# Patient Record
Sex: MALE | Race: WHITE | Employment: OTHER | URBAN - METROPOLITAN AREA
[De-identification: names, ages, dates, MRNs, and addresses within clinical notes are randomized per-mention and may not be internally consistent; named-entity substitution may affect disease eponyms.]

---

## 2018-03-09 PROBLEM — I10 HTN (HYPERTENSION): Status: ACTIVE | Noted: 2018-03-09

## 2018-03-09 PROBLEM — E87.1 HYPONATREMIA: Status: ACTIVE | Noted: 2018-03-09

## 2018-03-09 PROBLEM — N40.0 BPH (BENIGN PROSTATIC HYPERPLASIA): Status: ACTIVE | Noted: 2018-03-09

## 2018-03-09 PROBLEM — F17.200 SMOKER: Status: ACTIVE | Noted: 2018-03-09

## 2018-03-09 PROBLEM — E78.5 HLD (HYPERLIPIDEMIA): Status: ACTIVE | Noted: 2018-03-09

## 2018-03-09 PROBLEM — E11.9 DIABETES (HCC): Status: ACTIVE | Noted: 2018-03-09

## 2018-03-09 PROBLEM — L03.90 CELLULITIS: Status: ACTIVE | Noted: 2018-03-09

## 2018-03-14 ENCOUNTER — TELEPHONE (OUTPATIENT)
Dept: CARDIOTHORACIC SURGERY | Age: 73
End: 2018-03-14

## 2018-03-23 ENCOUNTER — OFFICE VISIT (OUTPATIENT)
Dept: VASCULAR SURGERY | Age: 73
End: 2018-03-23

## 2018-03-23 VITALS
BODY MASS INDEX: 27.71 KG/M2 | OXYGEN SATURATION: 97 % | DIASTOLIC BLOOD PRESSURE: 70 MMHG | WEIGHT: 182.8 LBS | SYSTOLIC BLOOD PRESSURE: 138 MMHG | HEART RATE: 59 BPM | HEIGHT: 68 IN

## 2018-03-23 DIAGNOSIS — I89.0 LYMPHEDEMA: ICD-10-CM

## 2018-03-23 PROCEDURE — 99214 OFFICE O/P EST MOD 30 MIN: CPT | Performed by: SURGERY

## 2018-03-23 PROCEDURE — 3017F COLORECTAL CA SCREEN DOC REV: CPT | Performed by: SURGERY

## 2018-03-23 PROCEDURE — G8427 DOCREV CUR MEDS BY ELIG CLIN: HCPCS | Performed by: SURGERY

## 2018-03-23 PROCEDURE — G8484 FLU IMMUNIZE NO ADMIN: HCPCS | Performed by: SURGERY

## 2018-03-23 PROCEDURE — G8419 CALC BMI OUT NRM PARAM NOF/U: HCPCS | Performed by: SURGERY

## 2018-03-23 PROCEDURE — 4040F PNEUMOC VAC/ADMIN/RCVD: CPT | Performed by: SURGERY

## 2018-03-28 ENCOUNTER — HOSPITAL ENCOUNTER (OUTPATIENT)
Dept: OTHER | Age: 73
Discharge: HOME | End: 2018-03-28
Attending: OPHTHALMOLOGY | Admitting: OPHTHALMOLOGY

## 2018-03-28 VITALS
WEIGHT: 182 LBS | HEART RATE: 59 BPM | HEIGHT: 68 IN | BODY MASS INDEX: 27.58 KG/M2 | SYSTOLIC BLOOD PRESSURE: 140 MMHG | DIASTOLIC BLOOD PRESSURE: 78 MMHG | RESPIRATION RATE: 16 BRPM

## 2018-03-28 RX ORDER — APRACLONIDINE HYDROCHLORIDE 5 MG/ML
1 SOLUTION/ DROPS OPHTHALMIC 2 TIMES DAILY PRN
Status: DISCONTINUED | OUTPATIENT
Start: 2018-03-28 | End: 2018-03-29 | Stop reason: HOSPADM

## 2018-03-28 RX ORDER — PROPARACAINE HYDROCHLORIDE 5 MG/ML
1 SOLUTION/ DROPS OPHTHALMIC ONCE
Status: COMPLETED | OUTPATIENT
Start: 2018-03-28 | End: 2018-03-28

## 2018-03-28 RX ORDER — SOFT LENS RINSE,STORE SOLUTION
SOLUTION, NON-ORAL MISCELLANEOUS ONCE
Status: COMPLETED | OUTPATIENT
Start: 2018-03-28 | End: 2018-03-28

## 2018-03-28 RX ORDER — TROPICAMIDE 10 MG/ML
1 SOLUTION/ DROPS OPHTHALMIC ONCE
Status: COMPLETED | OUTPATIENT
Start: 2018-03-28 | End: 2018-03-28

## 2018-03-28 RX ORDER — PHENYLEPHRINE HCL 2.5 %
1 DROPS OPHTHALMIC (EYE) ONCE
Status: COMPLETED | OUTPATIENT
Start: 2018-03-28 | End: 2018-03-28

## 2018-03-28 RX ADMIN — PROPARACAINE HYDROCHLORIDE 1 DROP: 5 SOLUTION/ DROPS OPHTHALMIC at 12:30

## 2018-03-28 RX ADMIN — Medication 1 DROP: at 11:36

## 2018-03-28 RX ADMIN — APRACLONIDINE HYDROCHLORIDE 1 DROP: 5 SOLUTION/ DROPS OPHTHALMIC at 11:43

## 2018-03-28 RX ADMIN — APRACLONIDINE HYDROCHLORIDE 1 DROP: 5 SOLUTION/ DROPS OPHTHALMIC at 12:54

## 2018-03-28 RX ADMIN — Medication: at 12:52

## 2018-03-28 RX ADMIN — TROPICAMIDE 1 DROP: 10 SOLUTION/ DROPS OPHTHALMIC at 11:30

## 2018-03-28 ASSESSMENT — PAIN - FUNCTIONAL ASSESSMENT
PAIN_FUNCTIONAL_ASSESSMENT: 0-10
PAIN_FUNCTIONAL_ASSESSMENT: 0-10

## 2018-04-12 ENCOUNTER — HOSPITAL ENCOUNTER (OUTPATIENT)
Dept: OTHER | Age: 73
Discharge: HOME | End: 2018-04-12
Attending: OPHTHALMOLOGY | Admitting: OPHTHALMOLOGY

## 2018-04-12 VITALS
WEIGHT: 182 LBS | HEART RATE: 59 BPM | SYSTOLIC BLOOD PRESSURE: 151 MMHG | DIASTOLIC BLOOD PRESSURE: 78 MMHG | RESPIRATION RATE: 16 BRPM | BODY MASS INDEX: 27.58 KG/M2 | HEIGHT: 68 IN

## 2018-04-12 RX ORDER — TROPICAMIDE 10 MG/ML
1 SOLUTION/ DROPS OPHTHALMIC ONCE
Status: COMPLETED | OUTPATIENT
Start: 2018-04-12 | End: 2018-04-12

## 2018-04-12 RX ORDER — PROPARACAINE HYDROCHLORIDE 5 MG/ML
1 SOLUTION/ DROPS OPHTHALMIC ONCE
Status: COMPLETED | OUTPATIENT
Start: 2018-04-12 | End: 2018-04-12

## 2018-04-12 RX ORDER — APRACLONIDINE HYDROCHLORIDE 5 MG/ML
1 SOLUTION/ DROPS OPHTHALMIC 2 TIMES DAILY PRN
Status: COMPLETED | OUTPATIENT
Start: 2018-04-12 | End: 2018-04-12

## 2018-04-12 RX ORDER — PHENYLEPHRINE HCL 2.5 %
1 DROPS OPHTHALMIC (EYE) ONCE
Status: COMPLETED | OUTPATIENT
Start: 2018-04-12 | End: 2018-04-12

## 2018-04-12 RX ORDER — SOFT LENS RINSE,STORE SOLUTION
SOLUTION, NON-ORAL MISCELLANEOUS ONCE
Status: COMPLETED | OUTPATIENT
Start: 2018-04-12 | End: 2018-04-12

## 2018-04-12 RX ADMIN — APRACLONIDINE HYDROCHLORIDE 1 DROP: 5 SOLUTION/ DROPS OPHTHALMIC at 11:27

## 2018-04-12 RX ADMIN — Medication: at 11:25

## 2018-04-12 RX ADMIN — TROPICAMIDE 1 DROP: 10 SOLUTION/ DROPS OPHTHALMIC at 11:23

## 2018-04-12 RX ADMIN — APRACLONIDINE HYDROCHLORIDE 1 DROP: 5 SOLUTION/ DROPS OPHTHALMIC at 12:27

## 2018-04-12 RX ADMIN — Medication 1 DROP: at 11:25

## 2018-04-12 RX ADMIN — PROPARACAINE HYDROCHLORIDE 1 DROP: 5 SOLUTION/ DROPS OPHTHALMIC at 12:21

## 2018-04-12 ASSESSMENT — PAIN - FUNCTIONAL ASSESSMENT: PAIN_FUNCTIONAL_ASSESSMENT: 0-10

## 2018-10-26 ENCOUNTER — HOSPITAL ENCOUNTER (EMERGENCY)
Age: 73
Discharge: HOME | End: 2018-10-26
Attending: EMERGENCY MEDICINE
Payer: MEDICARE

## 2018-10-26 ENCOUNTER — APPOINTMENT (OUTPATIENT)
Dept: CT IMAGING | Age: 73
End: 2018-10-26
Payer: MEDICARE

## 2018-10-26 ENCOUNTER — APPOINTMENT (OUTPATIENT)
Dept: GENERAL RADIOLOGY | Age: 73
End: 2018-10-26
Payer: MEDICARE

## 2018-10-26 VITALS
RESPIRATION RATE: 15 BRPM | OXYGEN SATURATION: 98 % | DIASTOLIC BLOOD PRESSURE: 74 MMHG | TEMPERATURE: 99.8 F | SYSTOLIC BLOOD PRESSURE: 129 MMHG | HEART RATE: 89 BPM

## 2018-10-26 DIAGNOSIS — I95.1 ORTHOSTATIC HYPOTENSION: ICD-10-CM

## 2018-10-26 DIAGNOSIS — R42 DIZZINESS: Primary | ICD-10-CM

## 2018-10-26 DIAGNOSIS — L03.116 LEFT LEG CELLULITIS: ICD-10-CM

## 2018-10-26 LAB
A/G RATIO: 0.9 (ref 1.1–2.2)
ALBUMIN SERPL-MCNC: 3.2 G/DL (ref 3.4–5)
ALP BLD-CCNC: 67 U/L (ref 40–129)
ALT SERPL-CCNC: 8 U/L (ref 10–40)
ANION GAP SERPL CALCULATED.3IONS-SCNC: 12 MMOL/L (ref 3–16)
AST SERPL-CCNC: 14 U/L (ref 15–37)
BASOPHILS ABSOLUTE: 0 K/UL (ref 0–0.2)
BASOPHILS RELATIVE PERCENT: 0.3 %
BILIRUB SERPL-MCNC: 1.1 MG/DL (ref 0–1)
BILIRUBIN URINE: NEGATIVE
BLOOD, URINE: ABNORMAL
BUN BLDV-MCNC: 9 MG/DL (ref 7–20)
CALCIUM SERPL-MCNC: 8.8 MG/DL (ref 8.3–10.6)
CHLORIDE BLD-SCNC: 96 MMOL/L (ref 99–110)
CLARITY: CLEAR
CO2: 24 MMOL/L (ref 21–32)
COLOR: YELLOW
CREAT SERPL-MCNC: 0.8 MG/DL (ref 0.8–1.3)
EKG ATRIAL RATE: 97 BPM
EKG DIAGNOSIS: NORMAL
EKG P AXIS: 24 DEGREES
EKG P-R INTERVAL: 152 MS
EKG Q-T INTERVAL: 328 MS
EKG QRS DURATION: 70 MS
EKG QTC CALCULATION (BAZETT): 416 MS
EKG R AXIS: 41 DEGREES
EKG T AXIS: 44 DEGREES
EKG VENTRICULAR RATE: 97 BPM
EOSINOPHILS ABSOLUTE: 0 K/UL (ref 0–0.6)
EOSINOPHILS RELATIVE PERCENT: 0 %
GFR AFRICAN AMERICAN: >60
GFR NON-AFRICAN AMERICAN: >60
GLOBULIN: 3.4 G/DL
GLUCOSE BLD-MCNC: 108 MG/DL (ref 70–99)
GLUCOSE URINE: NEGATIVE MG/DL
HCT VFR BLD CALC: 45.1 % (ref 40.5–52.5)
HEMOGLOBIN: 14.9 G/DL (ref 13.5–17.5)
KETONES, URINE: NEGATIVE MG/DL
LACTIC ACID: 1.4 MMOL/L (ref 0.4–2)
LEUKOCYTE ESTERASE, URINE: NEGATIVE
LYMPHOCYTES ABSOLUTE: 0.4 K/UL (ref 1–5.1)
LYMPHOCYTES RELATIVE PERCENT: 2.9 %
MCH RBC QN AUTO: 30.6 PG (ref 26–34)
MCHC RBC AUTO-ENTMCNC: 33 G/DL (ref 31–36)
MCV RBC AUTO: 92.8 FL (ref 80–100)
MICROSCOPIC EXAMINATION: YES
MONOCYTES ABSOLUTE: 0.6 K/UL (ref 0–1.3)
MONOCYTES RELATIVE PERCENT: 4.4 %
NEUTROPHILS ABSOLUTE: 13.4 K/UL (ref 1.7–7.7)
NEUTROPHILS RELATIVE PERCENT: 92.4 %
NITRITE, URINE: NEGATIVE
PDW BLD-RTO: 13.5 % (ref 12.4–15.4)
PH UA: 7.5
PLATELET # BLD: 161 K/UL (ref 135–450)
PMV BLD AUTO: 7.9 FL (ref 5–10.5)
POTASSIUM SERPL-SCNC: 3.8 MMOL/L (ref 3.5–5.1)
PROTEIN UA: NEGATIVE MG/DL
RBC # BLD: 4.86 M/UL (ref 4.2–5.9)
RBC UA: ABNORMAL /HPF (ref 0–2)
SODIUM BLD-SCNC: 132 MMOL/L (ref 136–145)
SPECIFIC GRAVITY UA: 1.01
TOTAL PROTEIN: 6.6 G/DL (ref 6.4–8.2)
TROPONIN: <0.01 NG/ML
URINE TYPE: ABNORMAL
UROBILINOGEN, URINE: 2 E.U./DL
WBC # BLD: 14.5 K/UL (ref 4–11)
WBC UA: ABNORMAL /HPF (ref 0–5)

## 2018-10-26 PROCEDURE — 6360000004 HC RX CONTRAST MEDICATION: Performed by: EMERGENCY MEDICINE

## 2018-10-26 PROCEDURE — 96360 HYDRATION IV INFUSION INIT: CPT

## 2018-10-26 PROCEDURE — 71046 X-RAY EXAM CHEST 2 VIEWS: CPT

## 2018-10-26 PROCEDURE — 2580000003 HC RX 258: Performed by: EMERGENCY MEDICINE

## 2018-10-26 PROCEDURE — 81001 URINALYSIS AUTO W/SCOPE: CPT

## 2018-10-26 PROCEDURE — 84484 ASSAY OF TROPONIN QUANT: CPT

## 2018-10-26 PROCEDURE — 96361 HYDRATE IV INFUSION ADD-ON: CPT

## 2018-10-26 PROCEDURE — 85025 COMPLETE CBC W/AUTO DIFF WBC: CPT

## 2018-10-26 PROCEDURE — 93010 ELECTROCARDIOGRAM REPORT: CPT | Performed by: INTERNAL MEDICINE

## 2018-10-26 PROCEDURE — 93005 ELECTROCARDIOGRAM TRACING: CPT | Performed by: PHYSICIAN ASSISTANT

## 2018-10-26 PROCEDURE — 80053 COMPREHEN METABOLIC PANEL: CPT

## 2018-10-26 PROCEDURE — 83605 ASSAY OF LACTIC ACID: CPT

## 2018-10-26 PROCEDURE — 99285 EMERGENCY DEPT VISIT HI MDM: CPT

## 2018-10-26 PROCEDURE — 71260 CT THORAX DX C+: CPT

## 2018-10-26 RX ORDER — CLINDAMYCIN HYDROCHLORIDE 150 MG/1
450 CAPSULE ORAL 3 TIMES DAILY
Qty: 90 CAPSULE | Refills: 0 | Status: SHIPPED | OUTPATIENT
Start: 2018-10-26 | End: 2018-11-05

## 2018-10-26 RX ORDER — SODIUM CHLORIDE 9 MG/ML
1000 INJECTION, SOLUTION INTRAVENOUS ONCE
Status: COMPLETED | OUTPATIENT
Start: 2018-10-26 | End: 2018-10-26

## 2018-10-26 RX ADMIN — IOPAMIDOL 75 ML: 755 INJECTION, SOLUTION INTRAVENOUS at 13:36

## 2018-10-26 RX ADMIN — SODIUM CHLORIDE 1000 ML: 9 INJECTION, SOLUTION INTRAVENOUS at 15:05

## 2018-10-26 ASSESSMENT — ENCOUNTER SYMPTOMS
ABDOMINAL PAIN: 0
NAUSEA: 0
RHINORRHEA: 0
SORE THROAT: 0
CONSTIPATION: 0
TROUBLE SWALLOWING: 0
SHORTNESS OF BREATH: 0
DIARRHEA: 0
VOMITING: 0
COUGH: 0
BACK PAIN: 0
CHEST TIGHTNESS: 0

## 2019-10-09 ENCOUNTER — ANESTHESIA (OUTPATIENT)
Dept: ENDOSCOPY | Age: 74
End: 2019-10-09
Payer: MEDICARE

## 2019-10-09 ENCOUNTER — HOSPITAL ENCOUNTER (OUTPATIENT)
Age: 74
Setting detail: OUTPATIENT SURGERY
Discharge: HOME | End: 2019-10-09
Attending: INTERNAL MEDICINE | Admitting: INTERNAL MEDICINE
Payer: MEDICARE

## 2019-10-09 ENCOUNTER — ANESTHESIA EVENT (OUTPATIENT)
Dept: ENDOSCOPY | Age: 74
End: 2019-10-09
Payer: MEDICARE

## 2019-10-09 ENCOUNTER — APPOINTMENT (OUTPATIENT)
Dept: GENERAL RADIOLOGY | Age: 74
End: 2019-10-09
Attending: INTERNAL MEDICINE
Payer: MEDICARE

## 2019-10-09 VITALS
OXYGEN SATURATION: 99 % | HEIGHT: 68 IN | SYSTOLIC BLOOD PRESSURE: 138 MMHG | TEMPERATURE: 96.8 F | RESPIRATION RATE: 16 BRPM | BODY MASS INDEX: 25.31 KG/M2 | WEIGHT: 167 LBS | HEART RATE: 52 BPM | DIASTOLIC BLOOD PRESSURE: 72 MMHG

## 2019-10-09 VITALS — OXYGEN SATURATION: 98 % | SYSTOLIC BLOOD PRESSURE: 87 MMHG | DIASTOLIC BLOOD PRESSURE: 62 MMHG

## 2019-10-09 LAB
GLUCOSE BLD-MCNC: 95 MG/DL (ref 70–99)
PERFORMED ON: NORMAL

## 2019-10-09 PROCEDURE — 3700000000 HC ANESTHESIA ATTENDED CARE: Performed by: INTERNAL MEDICINE

## 2019-10-09 PROCEDURE — 3609017700 HC EGD DILATION GASTRIC/DUODENAL STRICTURE: Performed by: INTERNAL MEDICINE

## 2019-10-09 PROCEDURE — 7100000010 HC PHASE II RECOVERY - FIRST 15 MIN: Performed by: INTERNAL MEDICINE

## 2019-10-09 PROCEDURE — 3700000001 HC ADD 15 MINUTES (ANESTHESIA): Performed by: INTERNAL MEDICINE

## 2019-10-09 PROCEDURE — 7100000011 HC PHASE II RECOVERY - ADDTL 15 MIN: Performed by: INTERNAL MEDICINE

## 2019-10-09 PROCEDURE — 71045 X-RAY EXAM CHEST 1 VIEW: CPT

## 2019-10-09 PROCEDURE — 2580000003 HC RX 258: Performed by: INTERNAL MEDICINE

## 2019-10-09 PROCEDURE — C1726 CATH, BAL DIL, NON-VASCULAR: HCPCS | Performed by: INTERNAL MEDICINE

## 2019-10-09 PROCEDURE — 6360000002 HC RX W HCPCS: Performed by: NURSE ANESTHETIST, CERTIFIED REGISTERED

## 2019-10-09 PROCEDURE — 3609018500 HC EGD US SCOPE W/ADJACENT STRUCTURES: Performed by: INTERNAL MEDICINE

## 2019-10-09 PROCEDURE — 2709999900 HC NON-CHARGEABLE SUPPLY: Performed by: INTERNAL MEDICINE

## 2019-10-09 RX ORDER — PROPOFOL 10 MG/ML
INJECTION, EMULSION INTRAVENOUS PRN
Status: DISCONTINUED | OUTPATIENT
Start: 2019-10-09 | End: 2019-10-09 | Stop reason: SDUPTHER

## 2019-10-09 RX ORDER — FENTANYL CITRATE 50 UG/ML
INJECTION, SOLUTION INTRAMUSCULAR; INTRAVENOUS PRN
Status: DISCONTINUED | OUTPATIENT
Start: 2019-10-09 | End: 2019-10-09 | Stop reason: SDUPTHER

## 2019-10-09 RX ORDER — SODIUM CHLORIDE, SODIUM LACTATE, POTASSIUM CHLORIDE, CALCIUM CHLORIDE 600; 310; 30; 20 MG/100ML; MG/100ML; MG/100ML; MG/100ML
INJECTION, SOLUTION INTRAVENOUS CONTINUOUS
Status: DISCONTINUED | OUTPATIENT
Start: 2019-10-09 | End: 2019-10-09 | Stop reason: HOSPADM

## 2019-10-09 RX ADMIN — PROPOFOL 500 MG: 10 INJECTION, EMULSION INTRAVENOUS at 11:55

## 2019-10-09 RX ADMIN — FENTANYL CITRATE 50 MCG: 50 INJECTION INTRAMUSCULAR; INTRAVENOUS at 11:55

## 2019-10-09 RX ADMIN — SODIUM CHLORIDE, POTASSIUM CHLORIDE, SODIUM LACTATE AND CALCIUM CHLORIDE: 600; 310; 30; 20 INJECTION, SOLUTION INTRAVENOUS at 11:55

## 2019-10-09 ASSESSMENT — PULMONARY FUNCTION TESTS
PIF_VALUE: 0
PIF_VALUE: 1
PIF_VALUE: 0
PIF_VALUE: 1
PIF_VALUE: 0

## 2019-10-09 ASSESSMENT — PAIN - FUNCTIONAL ASSESSMENT: PAIN_FUNCTIONAL_ASSESSMENT: 0-10

## 2020-11-22 ENCOUNTER — APPOINTMENT (OUTPATIENT)
Dept: GENERAL RADIOLOGY | Age: 75
End: 2020-11-22
Payer: MEDICARE

## 2020-11-22 ENCOUNTER — HOSPITAL ENCOUNTER (EMERGENCY)
Age: 75
Discharge: HOME | End: 2020-11-22
Attending: EMERGENCY MEDICINE
Payer: MEDICARE

## 2020-11-22 VITALS
TEMPERATURE: 97.9 F | OXYGEN SATURATION: 97 % | HEART RATE: 74 BPM | DIASTOLIC BLOOD PRESSURE: 79 MMHG | RESPIRATION RATE: 16 BRPM | SYSTOLIC BLOOD PRESSURE: 157 MMHG

## 2020-11-22 LAB
A/G RATIO: 1.1 (ref 1.1–2.2)
ALBUMIN SERPL-MCNC: 3.5 G/DL (ref 3.4–5)
ALP BLD-CCNC: 88 U/L (ref 40–129)
ALT SERPL-CCNC: 12 U/L (ref 10–40)
ANION GAP SERPL CALCULATED.3IONS-SCNC: 6 MMOL/L (ref 3–16)
AST SERPL-CCNC: 18 U/L (ref 15–37)
BASOPHILS ABSOLUTE: 0.1 K/UL (ref 0–0.2)
BASOPHILS RELATIVE PERCENT: 0.9 %
BILIRUB SERPL-MCNC: 0.6 MG/DL (ref 0–1)
BUN BLDV-MCNC: 9 MG/DL (ref 7–20)
CALCIUM SERPL-MCNC: 9.3 MG/DL (ref 8.3–10.6)
CHLORIDE BLD-SCNC: 99 MMOL/L (ref 99–110)
CO2: 29 MMOL/L (ref 21–32)
CREAT SERPL-MCNC: 0.9 MG/DL (ref 0.8–1.3)
D DIMER: 4333 NG/ML DDU (ref 0–229)
EOSINOPHILS ABSOLUTE: 0.1 K/UL (ref 0–0.6)
EOSINOPHILS RELATIVE PERCENT: 1.6 %
GFR AFRICAN AMERICAN: >60
GFR NON-AFRICAN AMERICAN: >60
GLOBULIN: 3.1 G/DL
GLUCOSE BLD-MCNC: 121 MG/DL (ref 70–99)
HCT VFR BLD CALC: 35.9 % (ref 40.5–52.5)
HEMOGLOBIN: 11.6 G/DL (ref 13.5–17.5)
LYMPHOCYTES ABSOLUTE: 0.5 K/UL (ref 1–5.1)
LYMPHOCYTES RELATIVE PERCENT: 6.1 %
MCH RBC QN AUTO: 25.8 PG (ref 26–34)
MCHC RBC AUTO-ENTMCNC: 32.3 G/DL (ref 31–36)
MCV RBC AUTO: 79.8 FL (ref 80–100)
MONOCYTES ABSOLUTE: 1 K/UL (ref 0–1.3)
MONOCYTES RELATIVE PERCENT: 11.8 %
NEUTROPHILS ABSOLUTE: 6.7 K/UL (ref 1.7–7.7)
NEUTROPHILS RELATIVE PERCENT: 79.6 %
PDW BLD-RTO: 17.6 % (ref 12.4–15.4)
PLATELET # BLD: 194 K/UL (ref 135–450)
PMV BLD AUTO: 7.7 FL (ref 5–10.5)
POTASSIUM REFLEX MAGNESIUM: 4.1 MMOL/L (ref 3.5–5.1)
RBC # BLD: 4.5 M/UL (ref 4.2–5.9)
SEDIMENTATION RATE, ERYTHROCYTE: 25 MM/HR (ref 0–20)
SODIUM BLD-SCNC: 134 MMOL/L (ref 136–145)
TOTAL PROTEIN: 6.6 G/DL (ref 6.4–8.2)
URIC ACID, SERUM: 5.8 MG/DL (ref 3.5–7.2)
WBC # BLD: 8.4 K/UL (ref 4–11)

## 2020-11-22 PROCEDURE — 6370000000 HC RX 637 (ALT 250 FOR IP): Performed by: FAMILY MEDICINE

## 2020-11-22 PROCEDURE — 99284 EMERGENCY DEPT VISIT MOD MDM: CPT

## 2020-11-22 PROCEDURE — 73110 X-RAY EXAM OF WRIST: CPT

## 2020-11-22 PROCEDURE — 85652 RBC SED RATE AUTOMATED: CPT

## 2020-11-22 PROCEDURE — 85379 FIBRIN DEGRADATION QUANT: CPT

## 2020-11-22 PROCEDURE — 85025 COMPLETE CBC W/AUTO DIFF WBC: CPT

## 2020-11-22 PROCEDURE — 84550 ASSAY OF BLOOD/URIC ACID: CPT

## 2020-11-22 PROCEDURE — 87040 BLOOD CULTURE FOR BACTERIA: CPT

## 2020-11-22 PROCEDURE — 80053 COMPREHEN METABOLIC PANEL: CPT

## 2020-11-22 RX ORDER — M-VIT,TX,IRON,MINS/CALC/FOLIC 27MG-0.4MG
1 TABLET ORAL DAILY
COMMUNITY

## 2020-11-22 RX ORDER — SIMVASTATIN 40 MG
TABLET ORAL
COMMUNITY
Start: 2020-07-22

## 2020-11-22 RX ORDER — OMEPRAZOLE 20 MG/1
CAPSULE, DELAYED RELEASE ORAL
COMMUNITY
Start: 2020-10-21

## 2020-11-22 RX ORDER — OXYCODONE HYDROCHLORIDE AND ACETAMINOPHEN 5; 325 MG/1; MG/1
1-2 TABLET ORAL EVERY 6 HOURS PRN
Qty: 12 TABLET | Refills: 0 | Status: SHIPPED | OUTPATIENT
Start: 2020-11-22 | End: 2020-11-25

## 2020-11-22 RX ORDER — POLYETHYLENE GLYCOL 3350 17 G/17G
17 POWDER, FOR SOLUTION ORAL DAILY
COMMUNITY

## 2020-11-22 RX ORDER — DOXYCYCLINE HYCLATE 100 MG/1
100 CAPSULE ORAL PRN
COMMUNITY

## 2020-11-22 RX ORDER — METOPROLOL TARTRATE 50 MG/1
TABLET, FILM COATED ORAL
COMMUNITY
Start: 2020-07-22

## 2020-11-22 RX ORDER — OXYCODONE HYDROCHLORIDE AND ACETAMINOPHEN 5; 325 MG/1; MG/1
1 TABLET ORAL EVERY 4 HOURS PRN
Status: DISCONTINUED | OUTPATIENT
Start: 2020-11-22 | End: 2020-11-22 | Stop reason: HOSPADM

## 2020-11-22 RX ADMIN — APIXABAN 10 MG: 5 TABLET, FILM COATED ORAL at 11:06

## 2020-11-22 RX ADMIN — OXYCODONE HYDROCHLORIDE AND ACETAMINOPHEN 1 TABLET: 5; 325 TABLET ORAL at 08:37

## 2020-11-22 ASSESSMENT — ENCOUNTER SYMPTOMS
COUGH: 0
RHINORRHEA: 0
EYE DISCHARGE: 0
PHOTOPHOBIA: 0
VOMITING: 0
CHEST TIGHTNESS: 0
ABDOMINAL DISTENTION: 0
CONSTIPATION: 0
SORE THROAT: 0
TROUBLE SWALLOWING: 0
FACIAL SWELLING: 0
ANAL BLEEDING: 0
SHORTNESS OF BREATH: 0
VOICE CHANGE: 0
NAUSEA: 0
DIARRHEA: 0
SINUS PRESSURE: 0
EYE REDNESS: 0
STRIDOR: 0
ABDOMINAL PAIN: 0
BACK PAIN: 0
EYE ITCHING: 0
EYE PAIN: 0
WHEEZING: 0
BLOOD IN STOOL: 0

## 2020-11-22 ASSESSMENT — PAIN SCALES - GENERAL
PAINLEVEL_OUTOF10: 10
PAINLEVEL_OUTOF10: 10
PAINLEVEL_OUTOF10: 6

## 2020-11-22 ASSESSMENT — PAIN DESCRIPTION - LOCATION: LOCATION: WRIST

## 2020-11-22 ASSESSMENT — PAIN DESCRIPTION - PAIN TYPE: TYPE: ACUTE PAIN

## 2020-11-22 NOTE — ED PROVIDER NOTES
I interviewed and examined this patient with Dr. Julian Whyte, resident. Please see her note for details of H&P. Marla Sal is a 76year old male right hand dominant who has a history of esophageal cancer, and recently had a thyroid tumor removed surgically. He presents with pain and swelling in the left arm for the last few days. Last week he had seen primary care for pain and edema of the left leg, but they did not feel that he needed imaging for a DVT. He has no history of DVT, and his only identifiable risk factor is his history of two types of cancer, and recent neck surgery. He denies chest pain or SOB. No syncope reported. He has a very remote history of a comminuted fracture of the left wrist due to falling down an elevator shaft 50 years ago. No recent fall or injury. BP (!) 146/76   Pulse 74   Temp 97.9 °F (36.6 °C) (Oral)   Resp 16   SpO2 98%     I have reviewed the following from the nursing documentation:      Prior to Admission medications    Medication Sig Start Date End Date Taking? Authorizing Provider   simvastatin (ZOCOR) 40 MG tablet Take by mouth 7/22/20  Yes Historical Provider, MD   metoprolol tartrate (LOPRESSOR) 50 MG tablet Take by mouth 7/22/20  Yes Historical Provider, MD   Multiple Vitamins-Minerals (THERAPEUTIC MULTIVITAMIN-MINERALS) tablet Take 1 tablet by mouth daily   Yes Historical Provider, MD   polyethylene glycol (MIRALAX) 17 g PACK packet Take 17 g by mouth daily   Yes Historical Provider, MD   doxycycline hyclate (VIBRAMYCIN) 100 MG capsule Take 100 mg by mouth as needed (pt sts \"as needed for leg infections\")   Yes Historical Provider, MD   oxyCODONE-acetaminophen (PERCOCET) 5-325 MG per tablet Take 1-2 tablets by mouth every 6 hours as needed for Pain for up to 3 days. 11/22/20 11/25/20 Yes Santino Orellana MD   aspirin 81 MG EC tablet Take 81 mg by mouth daily.    Yes Historical Provider, MD   omeprazole (PRILOSEC) 20 MG delayed release capsule years: 51.00    Smokeless tobacco: Never Used    Tobacco comment: stopped 02/20/20   Substance and Sexual Activity    Alcohol use: Yes     Alcohol/week: 6.0 standard drinks     Types: 6 Cans of beer per week     Comment: states last drink 6 days ago does not drink daily    Drug use: No    Sexual activity: Not on file   Lifestyle    Physical activity     Days per week: Not on file     Minutes per session: Not on file    Stress: Not on file   Relationships    Social connections     Talks on phone: Not on file     Gets together: Not on file     Attends Evangelical service: Not on file     Active member of club or organization: Not on file     Attends meetings of clubs or organizations: Not on file     Relationship status: Not on file    Intimate partner violence     Fear of current or ex partner: Not on file     Emotionally abused: Not on file     Physically abused: Not on file     Forced sexual activity: Not on file   Other Topics Concern    Not on file   Social History Narrative    Not on file           Review of Systems   Constitutional: Negative for activity change, appetite change, chills, diaphoresis, fatigue and fever. HENT: Negative. Negative for congestion, dental problem, ear pain, facial swelling, rhinorrhea, sinus pressure, sneezing, sore throat, tinnitus, trouble swallowing and voice change. Eyes: Negative for photophobia, pain, discharge, redness, itching and visual disturbance. Respiratory: Negative for cough, chest tightness, shortness of breath, wheezing and stridor. Cardiovascular: Negative for chest pain, palpitations and leg swelling. Gastrointestinal: Negative for abdominal distention, abdominal pain, anal bleeding, blood in stool, constipation, diarrhea, nausea and vomiting. Genitourinary: Negative for difficulty urinating, discharge, dysuria, frequency, hematuria, testicular pain and urgency.    Musculoskeletal: Positive for arthralgias (left wrist pain, radiates proximally). Negative for back pain, joint swelling, neck pain and neck stiffness. Swelling of the left arm, and previously left leg. Skin: Negative for rash and wound. Neurological: Negative for dizziness, syncope, facial asymmetry, speech difficulty, weakness, numbness and headaches. Hematological: Does not bruise/bleed easily. Psychiatric/Behavioral: Negative for agitation, confusion, hallucinations, self-injury, sleep disturbance and suicidal ideas. The patient is not nervous/anxious. All other systems reviewed and are negative. Physical Exam  Vitals signs and nursing note reviewed. Constitutional:       General: He is not in acute distress. Appearance: He is well-developed. HENT:      Head: Normocephalic and atraumatic. Right Ear: External ear normal.      Left Ear: External ear normal.      Nose: Nose normal.      Mouth/Throat:      Pharynx: No oropharyngeal exudate. Eyes:      General: No scleral icterus. Right eye: No discharge. Left eye: No discharge. Conjunctiva/sclera: Conjunctivae normal.      Pupils: Pupils are equal, round, and reactive to light. Neck:      Musculoskeletal: Normal range of motion and neck supple. Vascular: No JVD. Trachea: No tracheal deviation. Cardiovascular:      Rate and Rhythm: Normal rate and regular rhythm. Heart sounds: Normal heart sounds. No murmur. No friction rub. No gallop. Pulmonary:      Effort: Pulmonary effort is normal. No respiratory distress. Breath sounds: Normal breath sounds. No wheezing or rales. Abdominal:      General: Bowel sounds are normal. There is no distension. Palpations: Abdomen is soft. There is no mass. Tenderness: There is no abdominal tenderness. There is no guarding or rebound. Musculoskeletal: Normal range of motion. General: Swelling (LUE is swollen compared to the right from the wrist to the elbow. FROM all joint, including MP/PIp/DIP. good R/U pulses and brisk cap refill in all nail beds. ) present. No tenderness. Lymphadenopathy:      Cervical: No cervical adenopathy. Skin:     General: Skin is warm and dry. Coloration: Skin is not pale. Findings: No erythema or rash. Neurological:      Mental Status: He is alert and oriented to person, place, and time. Cranial Nerves: No cranial nerve deficit. Motor: No abnormal muscle tone. Coordination: Coordination normal.      Deep Tendon Reflexes: Reflexes are normal and symmetric. Reflexes normal.      Comments: All sensory and motor components of the brachial plexus tested are symmetric and intact. No focal deficits appreciated. Psychiatric:         Behavior: Behavior normal.         Thought Content: Thought content normal.         Judgment: Judgment normal.          Procedures     MDM   Results for orders placed or performed during the hospital encounter of 11/22/20   CBC Auto Differential   Result Value Ref Range    WBC 8.4 4.0 - 11.0 K/uL    RBC 4.50 4. 20 - 5.90 M/uL    Hemoglobin 11.6 (L) 13.5 - 17.5 g/dL    Hematocrit 35.9 (L) 40.5 - 52.5 %    MCV 79.8 (L) 80.0 - 100.0 fL    MCH 25.8 (L) 26.0 - 34.0 pg    MCHC 32.3 31.0 - 36.0 g/dL    RDW 17.6 (H) 12.4 - 15.4 %    Platelets 932 559 - 217 K/uL    MPV 7.7 5.0 - 10.5 fL    Neutrophils % 79.6 %    Lymphocytes % 6.1 %    Monocytes % 11.8 %    Eosinophils % 1.6 %    Basophils % 0.9 %    Neutrophils Absolute 6.7 1.7 - 7.7 K/uL    Lymphocytes Absolute 0.5 (L) 1.0 - 5.1 K/uL    Monocytes Absolute 1.0 0.0 - 1.3 K/uL    Eosinophils Absolute 0.1 0.0 - 0.6 K/uL    Basophils Absolute 0.1 0.0 - 0.2 K/uL   Comprehensive Metabolic Panel w/ Reflex to MG   Result Value Ref Range    Sodium 134 (L) 136 - 145 mmol/L    Potassium reflex Magnesium 4.1 3.5 - 5.1 mmol/L    Chloride 99 99 - 110 mmol/L    CO2 29 21 - 32 mmol/L    Anion Gap 6 3 - 16    Glucose 121 (H) 70 - 99 mg/dL    BUN 9 7 - 20 mg/dL    CREATININE 0.9 0.8 - 1.3 mg/dL GFR Non-African American >60 >60    GFR African American >60 >60    Calcium 9.3 8.3 - 10.6 mg/dL    Total Protein 6.6 6.4 - 8.2 g/dL    Alb 3.5 3.4 - 5.0 g/dL    Albumin/Globulin Ratio 1.1 1.1 - 2.2    Total Bilirubin 0.6 0.0 - 1.0 mg/dL    Alkaline Phosphatase 88 40 - 129 U/L    ALT 12 10 - 40 U/L    AST 18 15 - 37 U/L    Globulin 3.1 g/dL   Sedimentation Rate   Result Value Ref Range    Sed Rate 25 (H) 0 - 20 mm/Hr   D-dimer, quantitative   Result Value Ref Range    D-Dimer, Quant 4333 (H) 0 - 229 ng/mL DDU   Uric Acid   Result Value Ref Range    Uric Acid, Serum 5.8 3.5 - 7.2 mg/dL       I estimate there is LOW risk for COMPARTMENT SYNDROME,  SEPTIC ARTHRITIS, TENDON OR NEUROVASCULAR INJURY, thus I consider the discharge disposition reasonable. Pelon Thomas and I have discussed the diagnosis and risks, and we agree with discharging home to follow-up with their primary doctor or the referral orthopedist. We also discussed returning to the Emergency Department immediately if new or worsening symptoms occur. We have discussed the symptoms which are most concerning (e.g., changing or worsening pain, numbness, weakness) that necessitate immediate return. Final Impression    1. Left wrist pain    2. Elevated d-dimer    3. Pain and swelling of wrist, left    4. Left leg pain        Blood pressure (!) 146/76, pulse 74, temperature 97.9 °F (36.6 °C), temperature source Oral, resp. rate 16, SpO2 98 %. Radiology  Xr Wrist Left (min 3 Views)    Result Date: 11/22/2020  No acute osseous injury. Posttraumatic pattern, including chronic scapholunate ligament tear and arthropathy. There may be osteonecrosis of the proximal pole of the scaphoid. Old fractures of the ulna styloid and triquetrum.         Luz Marina Newsome MD  11/22/20 3624

## 2020-11-22 NOTE — ED PROVIDER NOTES
[facility]  ED  eMERGENCY dEPARTMENT eNCOUnter        Pt Name: Daniel Chakraborty  MRN: [id number]  [place] 1945  Date of evaluation: 11/22/2020  Provider: Josef Turcios DO  PCP: MD Dior Cuello       Chief Complaint   Patient presents with    Wrist Pain     left wrist pain and swelling; broke it awhile ago and pain started last night       HISTORY OFPRESENT ILLNESS   (Location/Symptom, Timing/Onset, Context/Setting, Quality, Duration, Modifying Factors,Severity)  Note limiting factors. Daniel Chakraborty is a 76 y.o. male  with a past medical hx significant for AAA, HTN, HLD, DM2, and hx of Esophageal and Thyroid papillary cancer presents to the ED with Left wrist pain for roughly a week. Pt has hx of fall about 50 years ago and shattered his left wrist. Now his wrist has chronic arthritic changes, such as swelling and aches. He states the pain has become more noticeable over the last week and began to further pain him the day prior to this ED visit. He rates his pain a 10/10 and describes it as sharp, achy, burning, dull, and more. He states that is occasionally radiates up to his elbow and rarely radiates up to his shoulder. He does have hx of DDD in his neck, but states the pain never radiates down from his neck. He can still move his wrist, but ROM is limited due to pain. He denies previous hx of blood clots, but does have an extensive cancer history within the last year. He has had no prior anticoagulation. He denies any recent trauma. He is retired, and does not actively use his hand on a daily basis. He denies any recent arm wounds or cuts. He denies fever, chills, chest pain, SOB, leg swelling, or leg tenderness. His wrist is extremely tender over his radioulnar joint. He states that he tried 2 tylenol pills , 1 pill of Clindamycin, and an 81 mg ASA to attempt to relieve his pain without any relief.  Due to this continued pain, he came to the ED. Pmhx:  He had a total thyroidectomy on 10-, papillary thyroid cancer, scheduled for iodine therapy/treatment, Follows with Heme/Onc REECE CHANEL. Franck Barahona MD   On 2020, pt is worried he is getting cellulitis in his L leg again. This is a recurrent problem. He is also using clindamycin 150 mg which is left over and , and triamcinolone cream. Leg is hot, not swollen, wearing compression stockings. No pain or tenderness. Feels like the entire legs gets hot and achy around his ankle. On 2020, [address] [facility]  1. There is no evidence of endoleak involving the aortic graft. 2. Duplex scan reveals there is no evidence of significant occlusive    disease involving the aortic graft.  Monophasic flow is noted throughout    the bilateral limbs. On 2020, Endovascular repair of infrarenal abdominal aortic aneurysm using the Medtronic stent graft system. Esophagoscopy, Wolcott Baldemar Esophagectomy with thoracic and abdominal lymphadenectomy, pyloroplasty, placement of feeding jejunostomy tube, omental flap 20   - Tobacco abuse, c/o dysphagia in 2019 and diagnosed with esophageal cancer in 2019- biopsy confirmed adenocarcinoma, s/p CROSS therapy, which was completed in 2019, no port    Nursing Notes were all reviewed and agreed with or any disagreements were addressed  in the HPI. REVIEW OF SYSTEMS    (2-9 systems for level 4, 10 or more for level 5)     Review of Systems    Positives and Pertinent negatives as per HPI. Except as noted above in the ROS, all other systems were reviewed andnegative.      PASTMEDICAL HISTORY     Past Medical History:   Diagnosis Date    Aneurysm ([address]     ABD    Colon polyp     Diabetes mellitus ([address]     Enlarged prostate     Hyperlipidemia     Hypertension     Reflux     Wears glasses          SURGICAL HISTORY       Past Surgical History:   Procedure Laterality Date    CATARACT REMOVAL WITH IMPLANT      left    COLONOSCOPY POLYPS    COLONOSCOPY  3/07/2012    COLONOSCOPY  8/18/14    polyps    CYST REMOVAL      EYE SURGERY  7/17/2012    rt. cataract removal w/ IOL    UPPER GASTROINTESTINAL ENDOSCOPY N/A 10/9/2019    UPPER EUS W/ANES. (11:00) performed by Mayank Diane MD at [facility]  10/9/2019    EGD DILATION BALLOON performed by Mayank Diane MD at [address] [place] [address]       Previous Medications    ASPIRIN 81 MG EC TABLET    Take 81 mg by mouth daily. DOXYCYCLINE HYCLATE (VIBRAMYCIN) 100 MG CAPSULE    Take 100 mg by mouth as needed (pt sts \"as needed for leg infections\")    LISINOPRIL PO    Take 20 mg by mouth daily. METFORMIN HCL PO    Take 1,000 mg by mouth daily     METOPROLOL TARTRATE (LOPRESSOR PO)    Take 50 mg by mouth 2 times daily     METOPROLOL TARTRATE (LOPRESSOR) 50 MG TABLET    Take by mouth    MULTIPLE VITAMINS-MINERALS (THERAPEUTIC MULTIVITAMIN-MINERALS) TABLET    Take 1 tablet by mouth daily    OMEPRAZOLE (PRILOSEC) 20 MG DELAYED RELEASE CAPSULE        POLYETHYLENE GLYCOL (MIRALAX) 17 G PACK PACKET    Take 17 g by mouth daily    SIMVASTATIN (ZOCOR) 40 MG TABLET    Take by mouth    SIMVASTATIN PO    Take 40 mg by mouth daily.        ALLERGIES     Penicillins    FAMILY HISTORY       Family History   Problem Relation Age of Onset    Cancer Father         LUNG          SOCIAL HISTORY       Social History     Socioeconomic History    Marital status:      Spouse name: None    Number of children: None    Years of education: None    Highest education level: None   Occupational History    None   Social Needs    Financial resource strain: None    Food insecurity     Worry: None     Inability: None    Transportation needs     Medical: None     Non-medical: None   Tobacco Use    Smoking status: Former Smoker     Packs/day: 1.00     Years: 51.00     Pack years: 51.00    Smokeless tobacco: Never Used    Tobacco comment: stopped 02/20/20   Substance and Sexual Activity    Alcohol use: Yes     Alcohol/week: 6.0 standard drinks     Types: 6 Cans of beer per week     Comment: states last drink 6 days ago does not drink daily    Drug use: No    Sexual activity: None   Lifestyle    Physical activity     Days per week: None     Minutes per session: None    Stress: None   Relationships    Social connections     Talks on phone: None     Gets together: None     Attends Shinto service: None     Active member of club or organization: None     Attends meetings of clubs or organizations: None     Relationship status: None    Intimate partner violence     Fear of current or ex partner: None     Emotionally abused: None     Physically abused: None     Forced sexual activity: None   Other Topics Concern    None   Social History Narrative    None       PHYSICAL EXAM    (up to 7 for level 4, 8 or more for level 5)     ED Triage Vitals   BP Temp Temp Source Pulse Resp SpO2 Height Weight   11/22/20 0758 11/22/20 0758 11/22/20 0758 11/22/20 0750 11/22/20 0758 11/22/20 0750 -- --   (!) 176/90 97.9 °F (36.6 °C) Oral 92 16 99 %         Physical Exam  Vitals signs and nursing note reviewed. Constitutional:       Appearance: Normal appearance. HENT:      Head: Normocephalic and atraumatic. Right Ear: External ear normal.      Left Ear: External ear normal.   Eyes:      Extraocular Movements: Extraocular movements intact. Conjunctiva/sclera: Conjunctivae normal.      Pupils: Pupils are equal, round, and reactive to light. Neck:      Musculoskeletal: Normal range of motion and neck supple. Comments: Large mass on left neck, correlated with recent imaging, likely an enlarged lymph node   Cardiovascular:      Rate and Rhythm: Normal rate and regular rhythm. Pulses: Normal pulses. Heart sounds: Normal heart sounds.    Pulmonary:      Effort: Pulmonary effort is normal.      Breath sounds: Normal breath sounds. Abdominal:      General: Abdomen is flat. Bowel sounds are normal.      Palpations: Abdomen is soft. Musculoskeletal:      Left wrist: He exhibits decreased range of motion, tenderness, bony tenderness and swelling. He exhibits no effusion, no crepitus, no deformity and no laceration. Arms:    Lymphadenopathy:      Cervical: Cervical adenopathy present. Neurological:      Mental Status: He is alert and oriented to person, place, and time. Sensory: Sensation is intact. Motor: Motor function is intact.          DIAGNOSTIC RESULTS   LABS:    Labs Reviewed   CBC WITH AUTO DIFFERENTIAL - Abnormal; Notable for the following components:       Result Value    Hemoglobin 11.6 (*)     Hematocrit 35.9 (*)     MCV 79.8 (*)     MCH 25.8 (*)     RDW 17.6 (*)     Lymphocytes Absolute 0.5 (*)     All other components within normal limits    Narrative:     Performed at:  [place] [address] [facility]   Phone [phone]   COMPREHENSIVE METABOLIC PANEL W/ REFLEX TO MG FOR LOW K - Abnormal; Notable for the following components:    Sodium 134 (*)     Glucose 121 (*)     All other components within normal limits    Narrative:     Performed at:  [place] [address] [facility]   Phone [phone]   SEDIMENTATION RATE - Abnormal; Notable for the following components:    Sed Rate 25 (*)     All other components within normal limits    Narrative:     Performed at:  [place] [address] [facility]   Phone [phone]   D-DIMER, QUANTITATIVE - Abnormal; Notable for the following components:    D-Dimer, Quant 4333 (*)     All other components within normal limits    Narrative:     Performed at:  [place] [address] [facility]   Phone [phone]   CULTURE, BLOOD 1   CULTURE, BLOOD 2   URIC ACID Narrative:     Performed at:  [facility]) [facility]  [address] [place] [address],  [place], [facility]   Phone [phone]   URIC ACID       All other labs were within normal range or not returned as of thisdictation. RADIOLOGY:   Non-plain film images such as CT, Ultrasound and MRI are read by the radiologist. Viv Jeffry images are visualized and preliminarily interpreted by the  ED Provider with the belowfindings:    Interpretation per the Radiologist below, if available at the time of this note:    XR WRIST LEFT (MIN 3 VIEWS)   Final Result   No acute osseous injury. Posttraumatic pattern, including chronic scapholunate ligament tear and   arthropathy. There may be osteonecrosis of the proximal pole of the   scaphoid. Old fractures of the ulna styloid and triquetrum. VL Extremity Venous Left    (Results Pending)   US DUP UPPER EXTREMITY LEFT VENOUS    (Results Pending)   US DUP LOWER EXTREMITY LEFT TAHMINA    (Results Pending)       PROCEDURES   Unless otherwise noted below, none     Procedures    CRITICAL CARE TIME   N/A    CONSULTS:  None    EMERGENCY DEPARTMENT COURSE and DIFFERENTIAL DIAGNOSIS/MDM:   Vitals:    Vitals:    11/22/20 0750 11/22/20 0758   BP:  (!) 176/90   Pulse: 92 74   Resp:  16   Temp:  97.9 °F (36.6 °C)   TempSrc:  Oral   SpO2: 99% 95%       Patient was given the following medications:  Medications   oxyCODONE-acetaminophen (PERCOCET) 5-325 MG per tablet 1 tablet (1 tablet Oral Given 11/22/20 0837)   apixaban (ELIQUIS) tablet 10 mg (10 mg Oral Given 11/22/20 1106)     The patient tolerated their visit well. Thepatient and / or the family were informed of the results of any tests, a time was given to answer questions. FINAL IMPRESSION      1. Left wrist pain    2. Elevated d-dimer    3. Pain and swelling of wrist, left    4.  Left leg pain      I estimate there is LOW risk for (including but not limited to) OPEN FRACTURE, COMPARTMENT SYNDROME, COMPLETE TENDON RUPTURE, NECROTIZING FASCIITIS, or ACUTE ARTERIAL INJURY, thus I consider the discharge disposition reasonable. Andrew Lopez and I have discussed the diagnosis and risks, and we agree with discharging home with close follow-up. We also discussed returning to the Emergency Department immediately if new or worsening symptoms occur. We have discussed the symptoms which are most concerning that necessitate immediate return. DISPOSITION/PLAN   DISPOSITION    Pt had elevated D-dimer in the setting of recent esophageal and papillary thyroid cancer and new wrist pain and swelling. Upper extremity blood clot was highest on the differential. Pt was not able to get a US Upper Extremity, since it was Sunday. Pt was given 10 mg Eliquis in the ED, and was given instructions to return for an US the following day, 11/23/2020 and return to the ED for further management if/when he is positive for blood clot. Pt is already established with Heme/Onc and instructed to make a follow up appt with them. For pain, he was instructed to take Tylenol 1000 mg TID, and a percocet at bedtime for pain control. XRAY in the ED of Left Wrist was negative for acute fracture. WBC was wnl. His ESR was 25. Therefore, there was low suspicion for septic arthritis or OM. PATIENT REFERRED TO:  Sharon Murcia MD  [address] [facility] [address]  [address] [place] [address]  [phone]          DISCHARGE MEDICATIONS:  Discharge Medication List as of 11/22/2020 10:55 AM      START taking these medications    Details   oxyCODONE-acetaminophen (PERCOCET) 5-325 MG per tablet Take 1-2 tablets by mouth every 6 hours as needed for Pain for up to 3 days. , Disp-12 tablet,R-0Print             DISCONTINUED MEDICATIONS:  Discharge Medication List as of 11/22/2020 10:55 AM            Alyson Walker DO (electronically signed)         Alyson Walker DO  Resident  11/22/20 7304

## 2020-11-22 NOTE — ED NOTES
Patient's visitor provided with copy of Emergency Department Visitor Restrictions. Instructed to review while waiting to visit with patient. Visitor verbalized understanding.      Gracie Gonzalez RN  11/22/20 0139

## 2020-11-23 ENCOUNTER — HOSPITAL ENCOUNTER (OUTPATIENT)
Dept: VASCULAR LAB | Age: 75
Discharge: HOME | End: 2020-11-23
Payer: MEDICARE

## 2020-11-23 PROCEDURE — 93971 EXTREMITY STUDY: CPT

## 2020-11-26 LAB
BLOOD CULTURE, ROUTINE: NORMAL
CULTURE, BLOOD 2: NORMAL

## 2022-08-07 ENCOUNTER — HOSPITAL ENCOUNTER (EMERGENCY)
Age: 77
Discharge: HOME | End: 2022-08-07
Attending: EMERGENCY MEDICINE
Payer: MEDICARE

## 2022-08-07 VITALS
DIASTOLIC BLOOD PRESSURE: 78 MMHG | SYSTOLIC BLOOD PRESSURE: 168 MMHG | RESPIRATION RATE: 18 BRPM | OXYGEN SATURATION: 100 % | HEART RATE: 62 BPM | TEMPERATURE: 98.2 F

## 2022-08-07 DIAGNOSIS — N42.9 PROSTATE DISORDER: ICD-10-CM

## 2022-08-07 DIAGNOSIS — E87.1 HYPONATREMIA: Primary | ICD-10-CM

## 2022-08-07 LAB
A/G RATIO: 1.4 (ref 1.1–2.2)
ALBUMIN SERPL-MCNC: 4.2 G/DL (ref 3.4–5)
ALP BLD-CCNC: 82 U/L (ref 40–129)
ALT SERPL-CCNC: 19 U/L (ref 10–40)
ANION GAP SERPL CALCULATED.3IONS-SCNC: 7 MMOL/L (ref 3–16)
AST SERPL-CCNC: 25 U/L (ref 15–37)
BASOPHILS ABSOLUTE: 0.1 K/UL (ref 0–0.2)
BASOPHILS RELATIVE PERCENT: 2 %
BILIRUB SERPL-MCNC: 0.6 MG/DL (ref 0–1)
BILIRUBIN URINE: NEGATIVE
BLOOD, URINE: ABNORMAL
BUN BLDV-MCNC: 5 MG/DL (ref 7–20)
CALCIUM SERPL-MCNC: 9.2 MG/DL (ref 8.3–10.6)
CHLORIDE BLD-SCNC: 93 MMOL/L (ref 99–110)
CLARITY: CLEAR
CO2: 27 MMOL/L (ref 21–32)
COLOR: YELLOW
CREAT SERPL-MCNC: 0.7 MG/DL (ref 0.8–1.3)
EOSINOPHILS ABSOLUTE: 0.1 K/UL (ref 0–0.6)
EOSINOPHILS RELATIVE PERCENT: 1.2 %
GFR AFRICAN AMERICAN: >60
GFR NON-AFRICAN AMERICAN: >60
GLUCOSE BLD-MCNC: 99 MG/DL (ref 70–99)
GLUCOSE URINE: NEGATIVE MG/DL
HCT VFR BLD CALC: 40.8 % (ref 40.5–52.5)
HEMOGLOBIN: 13.3 G/DL (ref 13.5–17.5)
KETONES, URINE: NEGATIVE MG/DL
LEUKOCYTE ESTERASE, URINE: NEGATIVE
LYMPHOCYTES ABSOLUTE: 0.8 K/UL (ref 1–5.1)
LYMPHOCYTES RELATIVE PERCENT: 11.9 %
MCH RBC QN AUTO: 27.4 PG (ref 26–34)
MCHC RBC AUTO-ENTMCNC: 32.7 G/DL (ref 31–36)
MCV RBC AUTO: 83.6 FL (ref 80–100)
MICROSCOPIC EXAMINATION: YES
MONOCYTES ABSOLUTE: 1 K/UL (ref 0–1.3)
MONOCYTES RELATIVE PERCENT: 14.3 %
NEUTROPHILS ABSOLUTE: 4.8 K/UL (ref 1.7–7.7)
NEUTROPHILS RELATIVE PERCENT: 70.6 %
NITRITE, URINE: NEGATIVE
PDW BLD-RTO: 17.3 % (ref 12.4–15.4)
PH UA: 7 (ref 5–8)
PLATELET # BLD: 185 K/UL (ref 135–450)
PMV BLD AUTO: 7.5 FL (ref 5–10.5)
POTASSIUM SERPL-SCNC: 3.8 MMOL/L (ref 3.5–5.1)
PROTEIN UA: NEGATIVE MG/DL
RBC # BLD: 4.88 M/UL (ref 4.2–5.9)
RBC UA: NORMAL /HPF (ref 0–4)
SODIUM BLD-SCNC: 127 MMOL/L (ref 136–145)
SPECIFIC GRAVITY UA: <=1.005 (ref 1–1.03)
TOTAL PROTEIN: 7.3 G/DL (ref 6.4–8.2)
URINE REFLEX TO CULTURE: ABNORMAL
URINE TYPE: ABNORMAL
UROBILINOGEN, URINE: 0.2 E.U./DL
WBC # BLD: 6.9 K/UL (ref 4–11)
WBC UA: NORMAL /HPF (ref 0–5)

## 2022-08-07 PROCEDURE — 81001 URINALYSIS AUTO W/SCOPE: CPT

## 2022-08-07 PROCEDURE — 85025 COMPLETE CBC W/AUTO DIFF WBC: CPT

## 2022-08-07 PROCEDURE — 99284 EMERGENCY DEPT VISIT MOD MDM: CPT

## 2022-08-07 PROCEDURE — 2580000003 HC RX 258: Performed by: EMERGENCY MEDICINE

## 2022-08-07 PROCEDURE — 6370000000 HC RX 637 (ALT 250 FOR IP): Performed by: EMERGENCY MEDICINE

## 2022-08-07 PROCEDURE — 80053 COMPREHEN METABOLIC PANEL: CPT

## 2022-08-07 RX ORDER — HYDROCODONE BITARTRATE AND ACETAMINOPHEN 5; 325 MG/1; MG/1
1 TABLET ORAL ONCE
Status: COMPLETED | OUTPATIENT
Start: 2022-08-07 | End: 2022-08-07

## 2022-08-07 RX ORDER — 0.9 % SODIUM CHLORIDE 0.9 %
1000 INTRAVENOUS SOLUTION INTRAVENOUS ONCE
Status: COMPLETED | OUTPATIENT
Start: 2022-08-07 | End: 2022-08-07

## 2022-08-07 RX ORDER — HYDROCODONE BITARTRATE AND ACETAMINOPHEN 5; 325 MG/1; MG/1
1 TABLET ORAL EVERY 6 HOURS PRN
Qty: 12 TABLET | Refills: 0 | Status: SHIPPED | OUTPATIENT
Start: 2022-08-07 | End: 2022-08-10

## 2022-08-07 RX ADMIN — HYDROCODONE BITARTRATE AND ACETAMINOPHEN 1 TABLET: 5; 325 TABLET ORAL at 09:22

## 2022-08-07 RX ADMIN — SODIUM CHLORIDE 1000 ML: 9 INJECTION, SOLUTION INTRAVENOUS at 10:06

## 2022-08-07 ASSESSMENT — PAIN SCALES - GENERAL: PAINLEVEL_OUTOF10: 5

## 2022-08-07 ASSESSMENT — PAIN DESCRIPTION - LOCATION: LOCATION: GROIN

## 2022-08-07 ASSESSMENT — PAIN - FUNCTIONAL ASSESSMENT: PAIN_FUNCTIONAL_ASSESSMENT: 0-10

## 2022-08-07 NOTE — ED PROVIDER NOTES
CHIEF COMPLAINT  Prostate Cancer (Detected high PSA, straight caths at home, now with increased pain tried OTC tylenol with no relief)      HISTORY OF PRESENT ILLNESS  Huma Rhodes is a 68 y.o. male with a history of hypertension, hyperlipidemia presenting for evaluation of prostate pain. He states that he has previously been diagnosed with prostate cancer, he has had high PSA levels. He has recently had to start self cathing. He did have some increased pain in the prostate area. Denies any upper abdominal pain, nausea, vomiting or fevers. Denies any specific dysuria. Denies any pain with insertion of the urinary catheter. He has had a prior TURP. He follows with Dr. Reagan Rodrigez of urology through [facility].  He states that he has been taking Tylenol for the pain however noted that it was a smaller dose than what he thought he was taking. No other complaints, modifying factors or associated symptoms. I have reviewed the following from the nursing documentation. Past Medical History:   Diagnosis Date    Aneurysm ([address]     ABD    Colon polyp     Diabetes mellitus ([address]     Enlarged prostate     Hyperlipidemia     Hypertension     Reflux     Wears glasses      Past Surgical History:   Procedure Laterality Date    CATARACT REMOVAL WITH IMPLANT  8/12    left    COLONOSCOPY      POLYPS    COLONOSCOPY  3/07/2012    COLONOSCOPY  8/18/14    polyps    CYST REMOVAL      EYE SURGERY  7/17/2012    rt. cataract removal w/ IOL    UPPER GASTROINTESTINAL ENDOSCOPY N/A 10/9/2019    UPPER EUS W/ANES.  (11:00) performed by Emily Velazquez MD at [address] [place] [address]  10/9/2019    EGD DILATION BALLOON performed by Emily Velazquez MD at [facility]     Family History   Problem Relation Age of Onset    Cancer Father         LUNG     Social History     Socioeconomic History    Marital status:      Spouse name: Not on file    Number of children: Not on file    Years of education: Not on file    Highest education level: Not on file   Occupational History    Not on file   Tobacco Use    Smoking status: Former     Packs/day: 1.00     Years: 51.00     Pack years: 51.00     Types: Cigarettes    Smokeless tobacco: Never    Tobacco comments:     stopped 02/20/20   Vaping Use    Vaping Use: Former    Substances: Always   Substance and Sexual Activity    Alcohol use: Yes     Alcohol/week: 6.0 standard drinks     Types: 6 Cans of beer per week     Comment: states last drink 6 days ago does not drink daily    Drug use: No    Sexual activity: Not on file   Other Topics Concern    Not on file   Social History Narrative    Not on file     Social Determinants of Health     Financial Resource Strain: Not on file   Food Insecurity: Not on file   Transportation Needs: Not on file   Physical Activity: Not on file   Stress: Not on file   Social Connections: Not on file   Intimate Partner Violence: Not on file   Housing Stability: Not on file     Current Facility-Administered Medications   Medication Dose Route Frequency Provider Last Rate Last Admin    0.9 % sodium chloride bolus  1,000 mL IntraVENous Once Pauline King .9 mL/hr at 08/07/22 1006 1,000 mL at 08/07/22 1006     Current Outpatient Medications   Medication Sig Dispense Refill    HYDROcodone-acetaminophen (NORCO) 5-325 MG per tablet Take 1 tablet by mouth every 6 hours as needed for Pain for up to 3 days. Intended supply: 3 days.  Take lowest dose possible to manage pain 12 tablet 0    omeprazole (PRILOSEC) 20 MG delayed release capsule       simvastatin (ZOCOR) 40 MG tablet Take by mouth      metoprolol tartrate (LOPRESSOR) 50 MG tablet Take by mouth      Multiple Vitamins-Minerals (THERAPEUTIC MULTIVITAMIN-MINERALS) tablet Take 1 tablet by mouth daily      polyethylene glycol (MIRALAX) 17 g PACK packet Take 17 g by mouth daily      doxycycline hyclate (VIBRAMYCIN) 100 MG capsule Take 100 mg by mouth as needed (pt Range    WBC 6.9 4.0 - 11.0 K/uL    RBC 4.88 4.20 - 5.90 M/uL    Hemoglobin 13.3 (L) 13.5 - 17.5 g/dL    Hematocrit 40.8 40.5 - 52.5 %    MCV 83.6 80.0 - 100.0 fL    MCH 27.4 26.0 - 34.0 pg    MCHC 32.7 31.0 - 36.0 g/dL    RDW 17.3 (H) 12.4 - 15.4 %    Platelets 203 838 - 784 K/uL    MPV 7.5 5.0 - 10.5 fL    Neutrophils % 70.6 %    Lymphocytes % 11.9 %    Monocytes % 14.3 %    Eosinophils % 1.2 %    Basophils % 2.0 %    Neutrophils Absolute 4.8 1.7 - 7.7 K/uL    Lymphocytes Absolute 0.8 (L) 1.0 - 5.1 K/uL    Monocytes Absolute 1.0 0.0 - 1.3 K/uL    Eosinophils Absolute 0.1 0.0 - 0.6 K/uL    Basophils Absolute 0.1 0.0 - 0.2 K/uL   Comprehensive Metabolic Panel   Result Value Ref Range    Sodium 127 (L) 136 - 145 mmol/L    Potassium 3.8 3.5 - 5.1 mmol/L    Chloride 93 (L) 99 - 110 mmol/L    CO2 27 21 - 32 mmol/L    Anion Gap 7 3 - 16    Glucose 99 70 - 99 mg/dL    BUN 5 (L) 7 - 20 mg/dL    Creatinine 0.7 (L) 0.8 - 1.3 mg/dL    GFR Non-African American >60 >60    GFR African American >60 >60    Calcium 9.2 8.3 - 10.6 mg/dL    Total Protein 7.3 6.4 - 8.2 g/dL    Albumin 4.2 3.4 - 5.0 g/dL    Albumin/Globulin Ratio 1.4 1.1 - 2.2    Total Bilirubin 0.6 0.0 - 1.0 mg/dL    Alkaline Phosphatase 82 40 - 129 U/L    ALT 19 10 - 40 U/L    AST 25 15 - 37 U/L   Microscopic Urinalysis   Result Value Ref Range    WBC, UA None seen 0 - 5 /HPF    RBC, UA 3-4 0 - 4 /HPF             RADIOLOGY  X-RAYS:  I have reviewed radiologic plain film image(s). ALL OTHER NON-PLAIN FILM IMAGES SUCH AS CT, ULTRASOUND AND MRI HAVE BEEN READ BY THE RADIOLOGIST. No orders to display          No results found. During the patient's ED course, the patient was given:  Medications   0.9 % sodium chloride bolus (1,000 mLs IntraVENous New Bag 8/7/22 1006)   HYDROcodone-acetaminophen (NORCO) 5-325 MG per tablet 1 tablet (1 tablet Oral Given 8/7/22 8900)        ED COURSE/MDM  Patient seen and evaluated. Old records reviewed.  Labs and imaging reviewed and results discussed with patient. 49-year-old male presenting for evaluation of prostate pain. Patient states this has been ongoing for the past day or so, he was taking Tylenol but at a lower dose than what he thought. Denies any systemic symptoms, dysuria. We will obtain laboratory evaluation, urinalysis. He has a benign abdominal exam that I do not believe that cross-sectional imaging is indicated at this time. He could have early prostatitis. Discussed potential empiric antibiotic management with this. Urinalysis without overt infection. He is mildly hyponatremic to 127. He will be given sodium chloride bolus for this. There is only mild hematuria on urinalysis. As patient does not have systemic signs of fever, empiric antibiotics will not be started for potential early prostatitis, patient states that he will contact his urologist tomorrow to discuss his symptoms. He will be prescribed pain medicine for his pain. He otherwise has a benign abdomen and as he has no white count, negative urinalysis, cross-sectional imaging will not be pursued at this time. The patient will be discharged from the emergency department. The patient was counseled on their diagnosis and any medications prescribed. They were advised on the need for PCP followup. They were counseled on the need to return to the emergency department if any of their symptoms were to worsen, change or have any other concerns. Discharged in stable condition. Patient was given scripts for the following medications. I counseled patient how to take these medications. New Prescriptions    HYDROCODONE-ACETAMINOPHEN (NORCO) 5-325 MG PER TABLET    Take 1 tablet by mouth every 6 hours as needed for Pain for up to 3 days. Intended supply: 3 days. Take lowest dose possible to manage pain       CLINICAL IMPRESSION  1. Hyponatremia    2.  Prostate disorder        Blood pressure (!) 178/80, pulse 58, temperature 98.2 °F (36.8 °C), temperature source Oral, resp. rate 18, SpO2 98 %. DISPOSITION  Reena Singleton was discharged to home in stable condition. This chart was generated in part by using Dragon Dictation system and may contain errors related to that system including errors in grammar, punctuation, and spelling, as well as words and phrases that may be inappropriate. If there are any questions or concerns please feel free to contact the dictating provider for clarification.        Mehran Olmedo MD  08/07/22 9983

## 2022-08-07 NOTE — DISCHARGE INSTRUCTIONS
Your sodium level was low, this could be related to dehydration not drinking enough fluids. Please have this rechecked by your primary care doctor in the next few weeks. Please call your urologist tomorrow to discuss your prostate related pain and whether you should start antibiotics. Please return for worsening symptoms.

## 2023-03-13 ENCOUNTER — HOSPITAL ENCOUNTER (INPATIENT)
Age: 78
LOS: 3 days | Discharge: HOME | End: 2023-03-17
Attending: EMERGENCY MEDICINE | Admitting: INTERNAL MEDICINE
Payer: MEDICARE

## 2023-03-13 DIAGNOSIS — I48.0 PAF (PAROXYSMAL ATRIAL FIBRILLATION) (HCC): ICD-10-CM

## 2023-03-13 DIAGNOSIS — N41.0 ACUTE PROSTATITIS: ICD-10-CM

## 2023-03-13 DIAGNOSIS — E87.1 HYPONATREMIA: Primary | ICD-10-CM

## 2023-03-13 LAB
A/G RATIO: 1.1 (ref 1.1–2.2)
ALBUMIN SERPL-MCNC: 3.5 G/DL (ref 3.4–5)
ALP BLD-CCNC: 68 U/L (ref 40–129)
ALT SERPL-CCNC: 12 U/L (ref 10–40)
ANION GAP SERPL CALCULATED.3IONS-SCNC: 7 MMOL/L (ref 3–16)
AST SERPL-CCNC: 18 U/L (ref 15–37)
BASOPHILS ABSOLUTE: 0 K/UL (ref 0–0.2)
BASOPHILS RELATIVE PERCENT: 0.6 %
BILIRUB SERPL-MCNC: 0.6 MG/DL (ref 0–1)
BILIRUBIN URINE: NEGATIVE
BLOOD, URINE: ABNORMAL
BUN BLDV-MCNC: 5 MG/DL (ref 7–20)
CALCIUM SERPL-MCNC: 9.1 MG/DL (ref 8.3–10.6)
CHLORIDE BLD-SCNC: 92 MMOL/L (ref 99–110)
CLARITY: CLEAR
CO2: 25 MMOL/L (ref 21–32)
COLOR: YELLOW
CREAT SERPL-MCNC: 0.7 MG/DL (ref 0.8–1.3)
EOSINOPHILS ABSOLUTE: 0 K/UL (ref 0–0.6)
EOSINOPHILS RELATIVE PERCENT: 0.1 %
GFR SERPL CREATININE-BSD FRML MDRD: >60 ML/MIN/{1.73_M2}
GLUCOSE BLD-MCNC: 120 MG/DL (ref 70–99)
GLUCOSE URINE: NEGATIVE MG/DL
HCT VFR BLD CALC: 42.8 % (ref 40.5–52.5)
HEMOGLOBIN: 14.1 G/DL (ref 13.5–17.5)
KETONES, URINE: NEGATIVE MG/DL
LEUKOCYTE ESTERASE, URINE: ABNORMAL
LYMPHOCYTES ABSOLUTE: 0.4 K/UL (ref 1–5.1)
LYMPHOCYTES RELATIVE PERCENT: 4.6 %
MCH RBC QN AUTO: 29.4 PG (ref 26–34)
MCHC RBC AUTO-ENTMCNC: 33 G/DL (ref 31–36)
MCV RBC AUTO: 88.9 FL (ref 80–100)
MICROSCOPIC EXAMINATION: YES
MONOCYTES ABSOLUTE: 1.2 K/UL (ref 0–1.3)
MONOCYTES RELATIVE PERCENT: 13.5 %
NEUTROPHILS ABSOLUTE: 7 K/UL (ref 1.7–7.7)
NEUTROPHILS RELATIVE PERCENT: 81.2 %
NITRITE, URINE: NEGATIVE
PDW BLD-RTO: 14 % (ref 12.4–15.4)
PH UA: 7 (ref 5–8)
PLATELET # BLD: 174 K/UL (ref 135–450)
PMV BLD AUTO: 7.7 FL (ref 5–10.5)
POTASSIUM SERPL-SCNC: 4.4 MMOL/L (ref 3.5–5.1)
PROTEIN UA: NEGATIVE MG/DL
RBC # BLD: 4.82 M/UL (ref 4.2–5.9)
RBC UA: ABNORMAL /HPF (ref 0–4)
SODIUM BLD-SCNC: 124 MMOL/L (ref 136–145)
SPECIFIC GRAVITY UA: <=1.005 (ref 1–1.03)
TOTAL PROTEIN: 6.7 G/DL (ref 6.4–8.2)
URINE TYPE: ABNORMAL
UROBILINOGEN, URINE: 0.2 E.U./DL
WBC # BLD: 8.6 K/UL (ref 4–11)
WBC UA: ABNORMAL /HPF (ref 0–5)

## 2023-03-13 PROCEDURE — 84550 ASSAY OF BLOOD/URIC ACID: CPT

## 2023-03-13 PROCEDURE — 84439 ASSAY OF FREE THYROXINE: CPT

## 2023-03-13 PROCEDURE — 51702 INSERT TEMP BLADDER CATH: CPT

## 2023-03-13 PROCEDURE — 81001 URINALYSIS AUTO W/SCOPE: CPT

## 2023-03-13 PROCEDURE — 84443 ASSAY THYROID STIM HORMONE: CPT

## 2023-03-13 PROCEDURE — 85025 COMPLETE CBC W/AUTO DIFF WBC: CPT

## 2023-03-13 PROCEDURE — 83930 ASSAY OF BLOOD OSMOLALITY: CPT

## 2023-03-13 PROCEDURE — 6370000000 HC RX 637 (ALT 250 FOR IP): Performed by: NURSE PRACTITIONER

## 2023-03-13 PROCEDURE — 99285 EMERGENCY DEPT VISIT HI MDM: CPT

## 2023-03-13 PROCEDURE — 80053 COMPREHEN METABOLIC PANEL: CPT

## 2023-03-13 RX ORDER — ONDANSETRON 4 MG/1
4 TABLET, ORALLY DISINTEGRATING ORAL ONCE
Status: COMPLETED | OUTPATIENT
Start: 2023-03-13 | End: 2023-03-13

## 2023-03-13 RX ORDER — LEVOTHYROXINE SODIUM 137 UG/1
137 TABLET ORAL DAILY
Status: ON HOLD | COMMUNITY
End: 2023-03-17 | Stop reason: HOSPADM

## 2023-03-13 RX ORDER — OXYCODONE HYDROCHLORIDE AND ACETAMINOPHEN 5; 325 MG/1; MG/1
1 TABLET ORAL ONCE
Status: COMPLETED | OUTPATIENT
Start: 2023-03-13 | End: 2023-03-13

## 2023-03-13 RX ORDER — TAMSULOSIN HYDROCHLORIDE 0.4 MG/1
0.4 CAPSULE ORAL DAILY
COMMUNITY

## 2023-03-13 RX ADMIN — OXYCODONE HYDROCHLORIDE AND ACETAMINOPHEN 1 TABLET: 5; 325 TABLET ORAL at 23:00

## 2023-03-13 RX ADMIN — ONDANSETRON 4 MG: 4 TABLET, ORALLY DISINTEGRATING ORAL at 23:00

## 2023-03-13 ASSESSMENT — PAIN SCALES - GENERAL
PAINLEVEL_OUTOF10: 7
PAINLEVEL_OUTOF10: 7

## 2023-03-13 ASSESSMENT — PAIN DESCRIPTION - LOCATION: LOCATION: ABDOMEN

## 2023-03-13 ASSESSMENT — PAIN - FUNCTIONAL ASSESSMENT: PAIN_FUNCTIONAL_ASSESSMENT: 0-10

## 2023-03-13 ASSESSMENT — PAIN DESCRIPTION - ORIENTATION: ORIENTATION: LOWER

## 2023-03-14 PROBLEM — N30.00 ACUTE CYSTITIS WITHOUT HEMATURIA: Status: ACTIVE | Noted: 2023-03-14

## 2023-03-14 PROBLEM — E03.9 ACQUIRED HYPOTHYROIDISM: Status: ACTIVE | Noted: 2023-03-14

## 2023-03-14 LAB
ANION GAP SERPL CALCULATED.3IONS-SCNC: 9 MMOL/L (ref 3–16)
BASOPHILS ABSOLUTE: 0 K/UL (ref 0–0.2)
BASOPHILS RELATIVE PERCENT: 0.3 %
BUN BLDV-MCNC: 4 MG/DL (ref 7–20)
CALCIUM SERPL-MCNC: 8.9 MG/DL (ref 8.3–10.6)
CHLORIDE BLD-SCNC: 95 MMOL/L (ref 99–110)
CO2: 24 MMOL/L (ref 21–32)
CORTIS SERPL-MCNC: 9.9 UG/DL
CREAT SERPL-MCNC: 0.6 MG/DL (ref 0.8–1.3)
EOSINOPHILS ABSOLUTE: 0 K/UL (ref 0–0.6)
EOSINOPHILS RELATIVE PERCENT: 0.1 %
GFR SERPL CREATININE-BSD FRML MDRD: >60 ML/MIN/{1.73_M2}
GLUCOSE BLD-MCNC: 106 MG/DL (ref 70–99)
GLUCOSE BLD-MCNC: 125 MG/DL (ref 70–99)
GLUCOSE BLD-MCNC: 135 MG/DL (ref 70–99)
GLUCOSE BLD-MCNC: 152 MG/DL (ref 70–99)
GLUCOSE BLD-MCNC: 96 MG/DL (ref 70–99)
GLUCOSE BLD-MCNC: 97 MG/DL (ref 70–99)
HCT VFR BLD CALC: 42.8 % (ref 40.5–52.5)
HEMOGLOBIN: 14.2 G/DL (ref 13.5–17.5)
LYMPHOCYTES ABSOLUTE: 0.4 K/UL (ref 1–5.1)
LYMPHOCYTES RELATIVE PERCENT: 4 %
MCH RBC QN AUTO: 29.6 PG (ref 26–34)
MCHC RBC AUTO-ENTMCNC: 33.2 G/DL (ref 31–36)
MCV RBC AUTO: 89.3 FL (ref 80–100)
MONOCYTES ABSOLUTE: 1.1 K/UL (ref 0–1.3)
MONOCYTES RELATIVE PERCENT: 12.7 %
NEUTROPHILS ABSOLUTE: 7.4 K/UL (ref 1.7–7.7)
NEUTROPHILS RELATIVE PERCENT: 82.9 %
OSMOLALITY SERPL: 279 MOSM/KG (ref 280–301)
OSMOLALITY UR: 129 MOSM/KG (ref 390–1070)
PDW BLD-RTO: 13.7 % (ref 12.4–15.4)
PERFORMED ON: ABNORMAL
PERFORMED ON: NORMAL
PERFORMED ON: NORMAL
PLATELET # BLD: 154 K/UL (ref 135–450)
PMV BLD AUTO: 7.8 FL (ref 5–10.5)
POTASSIUM REFLEX MAGNESIUM: 3.7 MMOL/L (ref 3.5–5.1)
RBC # BLD: 4.79 M/UL (ref 4.2–5.9)
SODIUM BLD-SCNC: 128 MMOL/L (ref 136–145)
SODIUM UR-SCNC: 21 MMOL/L
SODIUM URINE: 30 MMOL/L
T4 FREE SERPL-MCNC: 2.1 NG/DL (ref 0.9–1.8)
TSH SERPL DL<=0.005 MIU/L-ACNC: <0.01 UIU/ML (ref 0.27–4.2)
URIC ACID, SERUM: 5 MG/DL (ref 3.5–7.2)
WBC # BLD: 8.9 K/UL (ref 4–11)

## 2023-03-14 PROCEDURE — 2580000003 HC RX 258: Performed by: NURSE PRACTITIONER

## 2023-03-14 PROCEDURE — 6370000000 HC RX 637 (ALT 250 FOR IP): Performed by: INTERNAL MEDICINE

## 2023-03-14 PROCEDURE — 2580000003 HC RX 258: Performed by: INTERNAL MEDICINE

## 2023-03-14 PROCEDURE — 84300 ASSAY OF URINE SODIUM: CPT

## 2023-03-14 PROCEDURE — 6360000002 HC RX W HCPCS: Performed by: NURSE PRACTITIONER

## 2023-03-14 PROCEDURE — 6370000000 HC RX 637 (ALT 250 FOR IP): Performed by: NURSE PRACTITIONER

## 2023-03-14 PROCEDURE — 80048 BASIC METABOLIC PNL TOTAL CA: CPT

## 2023-03-14 PROCEDURE — 82533 TOTAL CORTISOL: CPT

## 2023-03-14 PROCEDURE — 83935 ASSAY OF URINE OSMOLALITY: CPT

## 2023-03-14 PROCEDURE — 85025 COMPLETE CBC W/AUTO DIFF WBC: CPT

## 2023-03-14 PROCEDURE — 6360000002 HC RX W HCPCS: Performed by: INTERNAL MEDICINE

## 2023-03-14 PROCEDURE — 36415 COLL VENOUS BLD VENIPUNCTURE: CPT

## 2023-03-14 PROCEDURE — 1200000000 HC SEMI PRIVATE

## 2023-03-14 RX ORDER — SODIUM CHLORIDE 9 MG/ML
INJECTION, SOLUTION INTRAVENOUS PRN
Status: DISCONTINUED | OUTPATIENT
Start: 2023-03-14 | End: 2023-03-17 | Stop reason: HOSPADM

## 2023-03-14 RX ORDER — ONDANSETRON 2 MG/ML
4 INJECTION INTRAMUSCULAR; INTRAVENOUS EVERY 4 HOURS PRN
Status: DISCONTINUED | OUTPATIENT
Start: 2023-03-14 | End: 2023-03-17 | Stop reason: HOSPADM

## 2023-03-14 RX ORDER — NIFEDIPINE 30 MG/1
30 TABLET, EXTENDED RELEASE ORAL DAILY
Status: DISCONTINUED | OUTPATIENT
Start: 2023-03-14 | End: 2023-03-15

## 2023-03-14 RX ORDER — INSULIN LISPRO 100 [IU]/ML
0-4 INJECTION, SOLUTION INTRAVENOUS; SUBCUTANEOUS NIGHTLY
Status: DISCONTINUED | OUTPATIENT
Start: 2023-03-14 | End: 2023-03-17 | Stop reason: HOSPADM

## 2023-03-14 RX ORDER — SODIUM CHLORIDE 1000 MG
1 TABLET, SOLUBLE MISCELLANEOUS
Status: DISCONTINUED | OUTPATIENT
Start: 2023-03-14 | End: 2023-03-15

## 2023-03-14 RX ORDER — ENOXAPARIN SODIUM 100 MG/ML
40 INJECTION SUBCUTANEOUS DAILY
Status: DISCONTINUED | OUTPATIENT
Start: 2023-03-14 | End: 2023-03-15

## 2023-03-14 RX ORDER — ACETAMINOPHEN 650 MG/1
650 SUPPOSITORY RECTAL EVERY 4 HOURS PRN
Status: DISCONTINUED | OUTPATIENT
Start: 2023-03-14 | End: 2023-03-17 | Stop reason: HOSPADM

## 2023-03-14 RX ORDER — METOPROLOL TARTRATE 50 MG/1
50 TABLET, FILM COATED ORAL 2 TIMES DAILY
Status: DISCONTINUED | OUTPATIENT
Start: 2023-03-14 | End: 2023-03-17 | Stop reason: HOSPADM

## 2023-03-14 RX ORDER — BENZONATATE 100 MG/1
200 CAPSULE ORAL 3 TIMES DAILY PRN
Status: DISCONTINUED | OUTPATIENT
Start: 2023-03-14 | End: 2023-03-17 | Stop reason: HOSPADM

## 2023-03-14 RX ORDER — 0.9 % SODIUM CHLORIDE 0.9 %
500 INTRAVENOUS SOLUTION INTRAVENOUS ONCE
Status: COMPLETED | OUTPATIENT
Start: 2023-03-14 | End: 2023-03-14

## 2023-03-14 RX ORDER — POLYETHYLENE GLYCOL 3350 17 G/17G
17 POWDER, FOR SOLUTION ORAL DAILY PRN
Status: DISCONTINUED | OUTPATIENT
Start: 2023-03-14 | End: 2023-03-17 | Stop reason: HOSPADM

## 2023-03-14 RX ORDER — SODIUM CHLORIDE 0.9 % (FLUSH) 0.9 %
10 SYRINGE (ML) INJECTION EVERY 12 HOURS SCHEDULED
Status: DISCONTINUED | OUTPATIENT
Start: 2023-03-14 | End: 2023-03-17 | Stop reason: HOSPADM

## 2023-03-14 RX ORDER — TAMSULOSIN HYDROCHLORIDE 0.4 MG/1
0.4 CAPSULE ORAL DAILY
Status: DISCONTINUED | OUTPATIENT
Start: 2023-03-14 | End: 2023-03-17 | Stop reason: HOSPADM

## 2023-03-14 RX ORDER — ACETAMINOPHEN 325 MG/1
650 TABLET ORAL EVERY 4 HOURS PRN
Status: DISCONTINUED | OUTPATIENT
Start: 2023-03-14 | End: 2023-03-17 | Stop reason: HOSPADM

## 2023-03-14 RX ORDER — INSULIN LISPRO 100 [IU]/ML
0-8 INJECTION, SOLUTION INTRAVENOUS; SUBCUTANEOUS
Status: DISCONTINUED | OUTPATIENT
Start: 2023-03-14 | End: 2023-03-17 | Stop reason: HOSPADM

## 2023-03-14 RX ORDER — DEXTROSE MONOHYDRATE 100 MG/ML
INJECTION, SOLUTION INTRAVENOUS CONTINUOUS PRN
Status: DISCONTINUED | OUTPATIENT
Start: 2023-03-14 | End: 2023-03-17 | Stop reason: HOSPADM

## 2023-03-14 RX ORDER — ATORVASTATIN CALCIUM 10 MG/1
20 TABLET, FILM COATED ORAL NIGHTLY
Status: DISCONTINUED | OUTPATIENT
Start: 2023-03-14 | End: 2023-03-17 | Stop reason: HOSPADM

## 2023-03-14 RX ORDER — CIPROFLOXACIN 2 MG/ML
400 INJECTION, SOLUTION INTRAVENOUS ONCE
Status: COMPLETED | OUTPATIENT
Start: 2023-03-14 | End: 2023-03-14

## 2023-03-14 RX ORDER — OXYCODONE HYDROCHLORIDE 5 MG/1
10 TABLET ORAL ONCE
Status: COMPLETED | OUTPATIENT
Start: 2023-03-14 | End: 2023-03-14

## 2023-03-14 RX ORDER — CIPROFLOXACIN 2 MG/ML
400 INJECTION, SOLUTION INTRAVENOUS EVERY 12 HOURS
Status: DISCONTINUED | OUTPATIENT
Start: 2023-03-14 | End: 2023-03-17 | Stop reason: HOSPADM

## 2023-03-14 RX ORDER — SODIUM CHLORIDE 0.9 % (FLUSH) 0.9 %
10 SYRINGE (ML) INJECTION PRN
Status: DISCONTINUED | OUTPATIENT
Start: 2023-03-14 | End: 2023-03-17 | Stop reason: HOSPADM

## 2023-03-14 RX ORDER — OXYCODONE HYDROCHLORIDE 5 MG/1
5 TABLET ORAL EVERY 4 HOURS PRN
Status: DISCONTINUED | OUTPATIENT
Start: 2023-03-14 | End: 2023-03-17 | Stop reason: HOSPADM

## 2023-03-14 RX ADMIN — OXYCODONE HYDROCHLORIDE 10 MG: 5 TABLET ORAL at 03:52

## 2023-03-14 RX ADMIN — CIPROFLOXACIN 400 MG: 2 INJECTION, SOLUTION INTRAVENOUS at 12:02

## 2023-03-14 RX ADMIN — TAMSULOSIN HYDROCHLORIDE 0.4 MG: 0.4 CAPSULE ORAL at 08:54

## 2023-03-14 RX ADMIN — OXYCODONE HYDROCHLORIDE 5 MG: 5 TABLET ORAL at 14:37

## 2023-03-14 RX ADMIN — Medication 1 G: at 16:49

## 2023-03-14 RX ADMIN — BENZONATATE 200 MG: 100 CAPSULE ORAL at 23:12

## 2023-03-14 RX ADMIN — ENOXAPARIN SODIUM 40 MG: 100 INJECTION SUBCUTANEOUS at 08:57

## 2023-03-14 RX ADMIN — SODIUM CHLORIDE, PRESERVATIVE FREE 10 ML: 5 INJECTION INTRAVENOUS at 19:54

## 2023-03-14 RX ADMIN — Medication 1 G: at 11:55

## 2023-03-14 RX ADMIN — SODIUM CHLORIDE, PRESERVATIVE FREE 10 ML: 5 INJECTION INTRAVENOUS at 08:59

## 2023-03-14 RX ADMIN — METOPROLOL TARTRATE 50 MG: 50 TABLET, FILM COATED ORAL at 19:54

## 2023-03-14 RX ADMIN — ATORVASTATIN CALCIUM 20 MG: 10 TABLET, FILM COATED ORAL at 19:54

## 2023-03-14 RX ADMIN — OXYCODONE HYDROCHLORIDE 5 MG: 5 TABLET ORAL at 09:02

## 2023-03-14 RX ADMIN — LEVOTHYROXINE SODIUM 137 MCG: 112 TABLET ORAL at 06:43

## 2023-03-14 RX ADMIN — SODIUM CHLORIDE 500 ML: 9 INJECTION, SOLUTION INTRAVENOUS at 00:44

## 2023-03-14 RX ADMIN — OXYCODONE HYDROCHLORIDE 5 MG: 5 TABLET ORAL at 02:22

## 2023-03-14 RX ADMIN — CIPROFLOXACIN 400 MG: 2 INJECTION, SOLUTION INTRAVENOUS at 00:43

## 2023-03-14 RX ADMIN — NIFEDIPINE 30 MG: 30 TABLET, FILM COATED, EXTENDED RELEASE ORAL at 11:56

## 2023-03-14 RX ADMIN — CIPROFLOXACIN 400 MG: 2 INJECTION, SOLUTION INTRAVENOUS at 23:18

## 2023-03-14 RX ADMIN — METOPROLOL TARTRATE 50 MG: 50 TABLET, FILM COATED ORAL at 08:53

## 2023-03-14 ASSESSMENT — PAIN DESCRIPTION - ORIENTATION
ORIENTATION: LOWER
ORIENTATION: LOWER

## 2023-03-14 ASSESSMENT — PAIN DESCRIPTION - LOCATION
LOCATION: ABDOMEN
LOCATION: ABDOMEN

## 2023-03-14 ASSESSMENT — PAIN SCALES - GENERAL
PAINLEVEL_OUTOF10: 3
PAINLEVEL_OUTOF10: 10
PAINLEVEL_OUTOF10: 7
PAINLEVEL_OUTOF10: 6
PAINLEVEL_OUTOF10: 10

## 2023-03-14 ASSESSMENT — PAIN DESCRIPTION - DESCRIPTORS
DESCRIPTORS: ACHING
DESCRIPTORS: ACHING

## 2023-03-14 ASSESSMENT — PAIN - FUNCTIONAL ASSESSMENT: PAIN_FUNCTIONAL_ASSESSMENT: PREVENTS OR INTERFERES SOME ACTIVE ACTIVITIES AND ADLS

## 2023-03-14 NOTE — PROGRESS NOTES
Pt a/o. Pt tolerating PO diet. Pt stated no nausea; no emesis. Pt stated he is still having pain but controlled with PO pain medication for now. Pt ambulated around room. Jaquez to gravity. Call light within reach of pt.

## 2023-03-14 NOTE — PROGRESS NOTES
4 Eyes Skin Assessment     The patient is being assess for  Admission    I agree that 2 RN's have performed a thorough Head to Toe Skin Assessment on the patient. ALL assessment sites listed below have been assessed. Areas assessed by both nurses:   [x]   Head, Face, and Ears   [x]   Shoulders, Back, and Chest  [x]   Arms, Elbows, and Hands   [x]   Coccyx, Sacrum, and IschIum  [x]   Legs, Feet, and Heels        Does the Patient have Skin Breakdown?   No         Denis Prevention initiated:  NA   Wound Care Orders initiated:  NA      [facility] nurse consulted for Pressure Injury (Stage 3,4, Unstageable, DTI, NWPT, and Complex wounds), New and Established Ostomies:  NA      Nurse 1 eSignature: Electronically signed by Ally Perla RN on 3/14/23 at 7:53 AM EDT    **SHARE this note so that the co-signing nurse is able to place an eSignature**    Nurse 2 eSignature: Electronically signed by Owen Mae on 3/14/23 at 7:55 AM EDT

## 2023-03-14 NOTE — ED PROVIDER NOTES
Emergency Department Provider Note     Location: [facility]  ED  3/13/2023    I independently performed a history and physical on Elizabeth Vizcarra. All diagnostic, treatment, and disposition decisions were made by myself in conjunction with the mid-level provider. Elizabeth Vizcarra is a 66 y.o. male here for lower abdominal pain x 2 days. Patient has a history of prostatitis and he also straight cath for urine. He saw his urologist but did not have pain medicine at home so he presented here for pain control. No other complaints, modifying factors or associated symptoms. ED Triage Vitals [03/13/23 2210]   BP Temp Temp Source Heart Rate Resp SpO2 Height Weight   (!) 177/83 98.1 °F (36.7 °C) Oral 80 18 97 % 5' 9\" (1.753 m) 170 lb (77.1 kg)        Exam showed WDWN male awake, alert, no facial droop, no slurred speech, abdomen soft, NDNT.        ED course/MDM  Patient seen and examined in room 22    Labs  Results for orders placed or performed during the hospital encounter of 03/13/23   CBC with Auto Differential   Result Value Ref Range    WBC 8.6 4.0 - 11.0 K/uL    RBC 4.82 4.20 - 5.90 M/uL    Hemoglobin 14.1 13.5 - 17.5 g/dL    Hematocrit 42.8 40.5 - 52.5 %    MCV 88.9 80.0 - 100.0 fL    MCH 29.4 26.0 - 34.0 pg    MCHC 33.0 31.0 - 36.0 g/dL    RDW 14.0 12.4 - 15.4 %    Platelets 173 901 - 672 K/uL    MPV 7.7 5.0 - 10.5 fL    Neutrophils % 81.2 %    Lymphocytes % 4.6 %    Monocytes % 13.5 %    Eosinophils % 0.1 %    Basophils % 0.6 %    Neutrophils Absolute 7.0 1.7 - 7.7 K/uL    Lymphocytes Absolute 0.4 (L) 1.0 - 5.1 K/uL    Monocytes Absolute 1.2 0.0 - 1.3 K/uL    Eosinophils Absolute 0.0 0.0 - 0.6 K/uL    Basophils Absolute 0.0 0.0 - 0.2 K/uL   Comprehensive Metabolic Panel   Result Value Ref Range    Sodium 124 (L) 136 - 145 mmol/L    Potassium 4.4 3.5 - 5.1 mmol/L    Chloride 92 (L) 99 - 110 mmol/L    CO2 25 21 - 32 mmol/L    Anion Gap 7 3 - 16    Glucose 120 (H) 70 - 99 mg/dL    BUN 5 (L) 7 - 20 mg/dL    Creatinine 0.7 (L) 0.8 - 1.3 mg/dL    Est, Glom Filt Rate >60 >60    Calcium 9.1 8.3 - 10.6 mg/dL    Total Protein 6.7 6.4 - 8.2 g/dL    Albumin 3.5 3.4 - 5.0 g/dL    Albumin/Globulin Ratio 1.1 1.1 - 2.2    Total Bilirubin 0.6 0.0 - 1.0 mg/dL    Alkaline Phosphatase 68 40 - 129 U/L    ALT 12 10 - 40 U/L    AST 18 15 - 37 U/L   Urinalysis   Result Value Ref Range    Color, UA Yellow Straw/Yellow    Clarity, UA Clear Clear    Glucose, Ur Negative Negative mg/dL    Bilirubin Urine Negative Negative    Ketones, Urine Negative Negative mg/dL    Specific Gravity, UA <=1.005 1.005 - 1.030    Blood, Urine TRACE-INTACT (A) Negative    pH, UA 7.0 5.0 - 8.0    Protein, UA Negative Negative mg/dL    Urobilinogen, Urine 0.2 <2.0 E.U./dL    Nitrite, Urine Negative Negative    Leukocyte Esterase, Urine SMALL (A) Negative    Microscopic Examination YES     Urine Type NotGiven    Microscopic Urinalysis   Result Value Ref Range    WBC, UA 6-9 (A) 0 - 5 /HPF    RBC, UA 0-2 0 - 4 /HPF   Sodium, Urine, Random   Result Value Ref Range    Sodium, Ur 30 Not Established mmol/L   Uric Acid   Result Value Ref Range    Uric Acid, Serum 5.0 3.5 - 7.2 mg/dL         - 66 y.o. male presented for lower abdominal pain. Abdominal exam benign. Urinalysis shows 6-9 white blood cell. Will send for urine culture. Given that patient does have a history of prostatitis we will treat empirically while waiting on urine culture. Incidentally found today is a sodium of 124. Home we reviewed patient's prior labs, we noted patient sodium has slowly trended down over course of 2 months. Nephrology consulted and admission recommended.       - Patient was given    ED Medication Orders (From admission, onward)      Start Ordered     Status Ordering Provider    03/14/23 0050 03/14/23 0050  oxyCODONE (ROXICODONE) immediate release tablet 5 mg  EVERY 4 HOURS PRN         Acknowledged Chelita MARIN    03/14/23 0030 03/14/23 0023  ciprofloxacin (CIPRO) IVPB 400 mg  ONCE        Question:  Antimicrobial Indications  Answer:  Urinary Tract Infection    Last MAR action: New Bag - by Teresita Latmolina on 03/14/23 at [address], [place]    03/14/23 0015 03/14/23 0012  0.9 % sodium chloride bolus  ONCE         Last MAR action: New Bag - by Teresita Lathe on 03[place]14/23 at [place]    03/13/23 2300 03/13/23 2257  oxyCODONE-acetaminophen (PERCOCET) 5-325 MG per tablet 1 tablet  ONCE         Last MAR action: Given - by Zoya Hays on 03/13/23 at [address] [facility]    03/13/23 2300 03/13/23 2257  ondansetron (ZOFRAN-ODT) disintegrating tablet 4 mg  ONCE         Last MAR action: Given - by Zoya Hays on 03/13/23 at [address]          For further details of Dominik Marcelo emergency department encounter, please see Verna Barroso NP's documentation. I, Mauricio Welch, am the primary attending of record and performed a substantive portion of the visit including all aspects of the medical decision making. I personally saw the patient and independently provided 0 minutes of non-concurrent critical care out of the total shared critical care time provided. The critical care time excludes separately billable procedures and updating family. Time spent is specifically for management of the presenting complaint and symptoms initially, direct bedside care, reevaluation, review of records, and consultation. There was a high probability of clinically significant life-threatening deterioration in the patient's condition, which required my urgent intervention. This chart was generated in part by using Dragon Dictation system and may contain errors related to that system including errors in grammar, punctuation, and spelling, as well as words and phrases that may be inappropriate. If there are any questions or concerns please feel free to contact the dictating provider for clarification.           Nena Flanagan MD  03/14/23 6362

## 2023-03-14 NOTE — PLAN OF CARE
Problem: Safety - Adult  Goal: Free from fall injury  Outcome: Progressing  Note: Pt educated on safety precautions while hoyt is in place. Pt able to carry hoyt bag and ambulates with steady gait. Pt uses call light to call out appropriately for assistance. Bed locked in lowest position; side rails 2/;4.

## 2023-03-14 NOTE — CARE COORDINATION
Case Management Assessment  Initial Evaluation    Date/Time of Evaluation: 3/14/2023 2:10 PM  Assessment Completed by: ESTELA Briceno    If patient is discharged prior to next notation, then this note serves as note for discharge by case management. Patient Name: Kanchan Jefferson                   YOB: 1945  Diagnosis: Acute prostatitis [N41.0]  Hyponatremia [E87.1]                   Date / Time: 3/13/2023  9:56 PM    Patient Admission Status: Inpatient   Readmission Risk (Low < 19, Mod (19-27), High > 27): Readmission Risk Score: 8.5    Current PCP: Rohith Hogan MD  PCP verified by CM? Yes    Chart Reviewed: Yes      History Provided by: Patient  Patient Orientation: Alert and Oriented, Person, Place, Situation, Self    Patient Cognition: Alert    Hospitalization in the last 30 days (Readmission):  No      Advance Directives:      Code Status: Full Code   Patient's Primary Decision Maker is: Legal Next of Kin    [facility] - [phone]    Discharge Planning:    Patient lives with: Spouse/Significant Other Type of Home: House  Primary Care Giver: Self  Patient Support Systems include: Spouse/Significant Other, Family Members   Current Financial resources: Medicare  Current community resources: None  Current services prior to admission: None            Type of Home Care services:  None    ADLS  Prior functional level: Independent in ADLs/IADLs  Current functional level: Independent in ADLs/IADLs    Family can provide assistance at DC: Yes  Would you like Case Management to discuss the discharge plan with any other family members/significant others, and if so, who?  No  Plans to Return to Present Housing: Yes  Potential Assistance needed at discharge: N/A  Patient expects to discharge to: [address] [place] [address] for transportation at discharge: Family    Financial    Payor: MEDICARE / Plan: MEDICARE PART A AND B / Product Type: *No Product type* /     Does insurance require precert for SNF: No    Potential assistance Purchasing Medications: No  Meds-to-Beds request: Yes      Janey Ruiz [id number] - [place], [facility]  [address] [address]  Phone: [phone] Fax: [phone]    Janey Ruiz [id number] - [place], [facility] [address] [facility]  [address]  Phone: [phone] Fax: [phone]    [facility] [address] [facility] [phone] [facility] [phone]  [address]  Phone: [phone] Fax: [phone]      Notes:    Factors facilitating achievement of predicted outcomes: Family support, Motivated, Cooperative, Pleasant, and Sense of humor    Barriers to discharge: Medical complications and Medication managment    Additional Case Management Notes: Patient from home with spouse and adult grandson and his children. Patient reports IPTA, denies needs.     The Plan for Transition of Care is related to the following treatment goals of Acute prostatitis [N41.0]  Hyponatremia [E87.1]    The Patient and/or Patient Representative Agree with the Discharge Plan? yes     Isaac Daine [facility]  Case Management Department  Ph: [phone] Fax: [phone]

## 2023-03-14 NOTE — CONSULTS
Placed a call to nephrology @ [phone]  RE: hyponatremia per John Ackerman NP   Re-paged @ [phone]  Dr. Kristian Sapp called back @ [phone]

## 2023-03-14 NOTE — CONSULTS
Interval History and plan:      Sodium is very low on admission  Going up  Urine sodium was suggestive of possible hypovolemia but he may have lung standing SIADH    Plan:    Start on salt tablets  Repeat urine  Also check cortisol level  Start on nifedipine for high blood pressure                   Assessment :     Hyponatremia  Sodium 124 on presentation  No history of alcoholism  Previous smoker  Has history of esophageal and thyroid cancer followed by oncology on surveillance  No active cancer at this time  Urine sodium was 13 on presentation    Echo: 2/20-normal EF, grade 1 diastolic dysfunction    Hypertension   BP: (154-170)/(71-84)  Heart Rate:  [81-85]   BP goal inpatient [phone] systolic inpatient     Prostatitis  On IV ciprofloxacin           [facility] would like to thank Tricia Schultz MD   for opportunity to serve this patient      Please call with questions at-   24 Hrs Answering service [phone] or  7 am- 5 pm via Perfect serve or cell phone  Cecile Stafford MD          CC/reason for consult :     Hyponatremia     HPI :     Meghann Pearl is a 66 y.o. male presented to   the hospital on 3/13/2023 with 2 to 3 days of prostate pain, possibly due to the prostatitis. He is known to have recurrent prostatitis. Also known to have hyponatremia and not seen by nephrology. In the ED his sodium was found to be 124 but asymptomatic. He was given 100 500 mL of normal saline bolus  We are consulted for hyponatremia and related issues    Has no nausea vomiting since. Does not take hydrochlorothiazide or chlorthalidone  No fluid overload history  Denies liver problem  Denies alcoholism  He is a previous smoker but is stopped.   He is known to have esophageal cancer and thyroid cancer remission      ROS:     Seen with-no family    positives in bold   Constitutional:  fever, chills, weakness, weight change, fatigue  Skin:  rash, pruritus, hair loss, bruising, dry skin, petechiae  Head, Face, Neck   headaches, swelling,  cervical adenopathy  Respiratory: shortness of breath, cough, or wheezing  Cardiovascular: chest pain, palpitations, dizzy, edema  Gastrointestinal: nausea, vomiting, diarrhea, constipation,belly pain    Yellow skin, blood in stool  Musculoskeletal:  back pain, muscle weakness, gait problems,       joint pain or swelling. Genitourinary:  dysuria, poor urine flow, flank pain, blood in urine  Neurologic:  vertigo, TIA'S, syncope, seizures, focal weakness  Psychosocial:  insomnia, anxiety, or depression. Additional positive findings:                    All other remaining systems are negative or unable to obtain        PMH/PSH/SH/Family History:     Past Medical History:   Diagnosis Date    Aneurysm ([facility] [address]     ABD    Colon polyp     Diabetes mellitus ([facility] [address]     Enlarged prostate     Hyperlipidemia     Hypertension     Reflux     Wears glasses        Past Surgical History:   Procedure Laterality Date    CATARACT REMOVAL WITH IMPLANT  8/12    left    COLONOSCOPY      POLYPS    COLONOSCOPY  3/07/2012    COLONOSCOPY  8/18/14    polyps    CYST REMOVAL      EYE SURGERY  7/17/2012    rt. cataract removal w/ IOL    UPPER GASTROINTESTINAL ENDOSCOPY N/A 10/9/2019    UPPER EUS W/ANES. (11:00) performed by Angelita Jaquez MD at [facility]  10/9/2019    EGD DILATION BALLOON performed by Angelita Jaquez MD at [address] [place] [address] Real        reports that he has quit smoking. His smoking use included cigarettes. He has a 51.00 pack-year smoking history. He has never used smokeless tobacco. He reports that he does not currently use alcohol. He reports that he does not use drugs. family history includes Cancer in his father.          Medication:     Current Facility-Administered Medications: ciprofloxacin (CIPRO) IVPB 400 mg, 400 mg, IntraVENous, Q12H  tamsulosin (FLOMAX) capsule 0.4 mg, 0.4 mg, Oral, Daily  atorvastatin (LIPITOR) tablet 20 mg, 20 mg, Oral, Nightly  metoprolol tartrate (LOPRESSOR) tablet 50 mg, 50 mg, Oral, BID  levothyroxine (SYNTHROID) tablet 137 mcg, 137 mcg, Oral, Daily  glucose chewable tablet 16 g, 4 tablet, Oral, PRN  dextrose bolus 10% 125 mL, 125 mL, IntraVENous, PRN **OR** dextrose bolus 10% 250 mL, 250 mL, IntraVENous, PRN  glucagon (rDNA) injection 1 mg, 1 mg, SubCUTAneous, PRN  dextrose 10 % infusion, , IntraVENous, Continuous PRN  sodium chloride flush 0.9 % injection 10 mL, 10 mL, IntraVENous, 2 times per day  sodium chloride flush 0.9 % injection 10 mL, 10 mL, IntraVENous, PRN  0.9 % sodium chloride infusion, , IntraVENous, PRN  enoxaparin (LOVENOX) injection 40 mg, 40 mg, SubCUTAneous, Daily  ondansetron (ZOFRAN) injection 4 mg, 4 mg, IntraVENous, Q4H PRN  polyethylene glycol (GLYCOLAX) packet 17 g, 17 g, Oral, Daily PRN  acetaminophen (TYLENOL) tablet 650 mg, 650 mg, Oral, Q4H PRN **OR** acetaminophen (TYLENOL) suppository 650 mg, 650 mg, Rectal, Q4H PRN  insulin lispro (HUMALOG) injection vial 0-8 Units, 0-8 Units, SubCUTAneous, TID WC  insulin lispro (HUMALOG) injection vial 0-4 Units, 0-4 Units, SubCUTAneous, Nightly  oxyCODONE (ROXICODONE) immediate release tablet 5 mg, 5 mg, Oral, Q4H PRN       Vitals :     Vitals:    03/14/23 0800   BP: (!) 164/71   Pulse: 81   Resp: 16   Temp: 98.8 °F (37.1 °C)   SpO2: 95%       I & O :       Intake/Output Summary (Last 24 hours) at 3/14/2023 1012  Last data filed at 3/14/2023 0644  Gross per 24 hour   Intake --   Output 1250 ml   Net -1250 ml        Physical Examination :     General appearance: Alert and oriented  Respiratory: NAD  Cardiovascular: NAD, Edema NAD  Abdomen: -NAD  Other relevant findings:-       LABS:     Recent Labs     03/13/23  2244 03/14/23  0528   WBC 8.6 8.9   HGB 14.1 14.2   HCT 42.8 42.8    154     Recent Labs     03/13/23  2244 03/14/23  0528   * 128*   K 4.4 3.7   CL 92* 95*   CO2 25 24   BUN 5* 4*   CREATININE 0.7* 0.6*   GLUCOSE 120* 106*            Thanks,   [facility]  [address] [place] [address]  Office: [phone]  Fax: [phone]

## 2023-03-14 NOTE — ED NOTES
Mr. Zenaida Chow is a 66 y.o. male who had concerns including Abdominal Pain (Pt reports lower abdominal pain over the past 2 days. Pt reports had some issues with constipation, however resolved with laxatives. Pt reports had similar symptoms 6 months ago and was told he has a prostate infection and was diagnosed with an abdominal aneurysm. ). Chief Complaint   Patient presents with    Abdominal Pain     Pt reports lower abdominal pain over the past 2 days. Pt reports had some issues with constipation, however resolved with laxatives. Pt reports had similar symptoms 6 months ago and was told he has a prostate infection and was diagnosed with an abdominal aneurysm. He is being admitted for:    Hyponatremia    His ED problem list included:    1. Hyponatremia    2. Acute prostatitis        Past Medical History:   Diagnosis Date    Aneurysm ([address]     ABD    Colon polyp     Diabetes mellitus ([address]     Enlarged prostate     Hyperlipidemia     Hypertension     Reflux     Wears glasses        Past Surgical History:   Procedure Laterality Date    CATARACT REMOVAL WITH IMPLANT  8/12    left    COLONOSCOPY      POLYPS    COLONOSCOPY  3/07/2012    COLONOSCOPY  8/18/14    polyps    CYST REMOVAL      EYE SURGERY  7/17/2012    rt. cataract removal w/ IOL    UPPER GASTROINTESTINAL ENDOSCOPY N/A 10/9/2019    UPPER EUS W/ANES.  (11:00) performed by Skyler Lua MD at [place] [address]  10/9/2019    EGD DILATION BALLOON performed by Skyler Lua MD at [address] [place] [address]       His recent abnormal labs were:    Labs Reviewed   CBC WITH AUTO DIFFERENTIAL - Abnormal; Notable for the following components:       Result Value    Lymphocytes Absolute 0.4 (*)     All other components within normal limits   COMPREHENSIVE METABOLIC PANEL - Abnormal; Notable for the following components:    Sodium 124 (*)     Chloride 92 (*)     Glucose 120 (*)     BUN 5 (*) Creatinine 0.7 (*)     All other components within normal limits   URINALYSIS - Abnormal; Notable for the following components:    Blood, Urine TRACE-INTACT (*)     Leukocyte Esterase, Urine SMALL (*)     All other components within normal limits   MICROSCOPIC URINALYSIS - Abnormal; Notable for the following components:    WBC, UA 6-9 (*)     All other components within normal limits   SODIUM, URINE, RANDOM   URIC ACID   OSMOLALITY, URINE   OSMOLALITY   OSMOLALITY   TSH   T4, FREE       His vital signs for the encounter were:    Patient Vitals for the past 24 hrs:   BP Temp Temp src Pulse Resp SpO2 Height Weight   03/14/23 0234 (!) 154/76 -- -- 84 15 95 % -- --   03/14/23 0204 (!) 142/72 -- -- 93 13 94 % -- --   03/14/23 0027 (!) 150/77 -- -- 87 11 96 % -- --   03/13/23 2210 (!) 177/83 98.1 °F (36.7 °C) Oral 80 18 97 % 5' 9\" (1.753 m) 170 lb (77.1 kg)        He has the following lines:    Peripheral IV 03/13/23 Right Antecubital (Active)   Site Assessment Clean, dry & intact 03/13/23 2245       Urinary Catheter 03/14/23 Jaquez (Active)   $ Urethral catheter insertion $ Not inserted for procedure 03/14/23 0101   Catheter Indications Urinary retention (acute or chronic), continuous bladder irrigation or bladder outlet obstruction 03/14/23 0101       He has received the following medications:    Medications   oxyCODONE (ROXICODONE) immediate release tablet 5 mg (5 mg Oral Given 3/14/23 0222)   oxyCODONE-acetaminophen (PERCOCET) 5-325 MG per tablet 1 tablet (1 tablet Oral Given 3/13/23 2300)   ondansetron (ZOFRAN-ODT) disintegrating tablet 4 mg (4 mg Oral Given 3/13/23 2300)   0.9 % sodium chloride bolus (0 mLs IntraVENous Stopped 3/14/23 0213)   ciprofloxacin (CIPRO) IVPB 400 mg (0 mg IntraVENous Stopped 3/14/23 0214)       He had the following images with impressions:    No results found.      Kaylah Camejo RN  03/14/23 8789

## 2023-03-14 NOTE — ED PROVIDER NOTES
[address] [facility]  ED  EMERGENCY DEPARTMENT ENCOUNTER        Pt Name: Ysabel Fiore  MRN: [id number]  [place] 1945  Date of evaluation: 3/13/2023  Provider: JORGE Marie - CNP  PCP: Justin Wong MD  Note Started: 1:45 AM EDT 3/14/23       I have seen and evaluated this patient with my supervising physician Dr. Sven Mercado. CHIEF COMPLAINT       Chief Complaint   Patient presents with    Abdominal Pain     Pt reports lower abdominal pain over the past 2 days. Pt reports had some issues with constipation, however resolved with laxatives. Pt reports had similar symptoms 6 months ago and was told he has a prostate infection and was diagnosed with an abdominal aneurysm. HISTORY OF PRESENT ILLNESS: 1 or more Elements     History From: the patient  Limitations to history : None    Ysabel Fiore is a 66 y.o. male who presents to the emergency department today with complaints of lower abdominal pain, he states that its been chronic in nature but worse over the last couple days, he states that he does have a history of a aneurysm but thinks that he is got prostatitis again, has history of prostate cancer and has had multiple episodes of prostatitis. Patient states that his urologist gives him pain medication which he thinks he needs. He denies any chest pain, denies any extremity pain, denies any fevers or trauma. He is here for further evaluation    Nursing Notes were all reviewed and agreed with or any disagreements were addressed in the HPI. REVIEW OF SYSTEMS :      Review of Systems    Positives and Pertinent negatives as per HPI.      SURGICAL HISTORY     Past Surgical History:   Procedure Laterality Date    CATARACT REMOVAL WITH IMPLANT  8/12    left    COLONOSCOPY      POLYPS    COLONOSCOPY  3/07/2012    COLONOSCOPY  8/18/14    polyps    CYST REMOVAL      EYE SURGERY  7/17/2012    rt. cataract removal w/ IOL    UPPER GASTROINTESTINAL ENDOSCOPY N/A 10/9/2019 UPPER EUS W/ANES. (11:00) performed by Skyler Lua MD at [address]  10/9/2019    EGD DILATION BALLOON performed by Skyler Lua MD at [address]       Previous Medications    ASPIRIN 81 MG EC TABLET    Take 81 mg by mouth daily. LEVOTHYROXINE (SYNTHROID) 137 MCG TABLET    Take 137 mcg by mouth Daily    METOPROLOL TARTRATE (LOPRESSOR PO)    Take 50 mg by mouth 2 times daily     MULTIPLE VITAMINS-MINERALS (THERAPEUTIC MULTIVITAMIN-MINERALS) TABLET    Take 1 tablet by mouth daily    OMEPRAZOLE (PRILOSEC) 20 MG DELAYED RELEASE CAPSULE        POLYETHYLENE GLYCOL (MIRALAX) 17 G PACK PACKET    Take 17 g by mouth daily    SIMVASTATIN (ZOCOR) 40 MG TABLET    Take by mouth    SIMVASTATIN PO    Take 40 mg by mouth daily.     TAMSULOSIN (FLOMAX) 0.4 MG CAPSULE    Take 0.4 mg by mouth daily       ALLERGIES     Penicillins    FAMILYHISTORY       Family History   Problem Relation Age of Onset    Cancer Father         LUNG        SOCIAL HISTORY       Social History     Tobacco Use    Smoking status: Former     Packs/day: 1.00     Years: 51.00     Pack years: 51.00     Types: Cigarettes    Smokeless tobacco: Never    Tobacco comments:     stopped 02/20/20   Vaping Use    Vaping Use: Former    Substances: Always   Substance Use Topics    Alcohol use: Not Currently     Comment: occ    Drug use: No       SCREENINGS        Demetrice Coma Scale  Eye Opening: Spontaneous  Best Verbal Response: Oriented  Best Motor Response: Obeys commands  Demetrice Coma Scale Score: 15                CIWA Assessment  BP: (!) 150/77  Heart Rate: 87           PHYSICAL EXAM  1 or more Elements     ED Triage Vitals [03/13/23 2210]   BP Temp Temp Source Heart Rate Resp SpO2 Height Weight   (!) 177/83 98.1 °F (36.7 °C) Oral 80 18 97 % 5' 9\" (1.753 m) 170 lb (77.1 kg)       Physical Exam    His abdomen was soft, some mild lower abdominal tenderness, no CVA tenderness. Heart is regular in rhythm with no murmur, lungs clear to auscultation. Cranial nerves II through XII are grossly intact, he is alert and oriented x3 with no focal neurologic deficits appreciated    DIAGNOSTIC RESULTS   LABS:    Labs Reviewed   CBC WITH AUTO DIFFERENTIAL - Abnormal; Notable for the following components:       Result Value    Lymphocytes Absolute 0.4 (*)     All other components within normal limits   COMPREHENSIVE METABOLIC PANEL - Abnormal; Notable for the following components:    Sodium 124 (*)     Chloride 92 (*)     Glucose 120 (*)     BUN 5 (*)     Creatinine 0.7 (*)     All other components within normal limits   URINALYSIS - Abnormal; Notable for the following components:    Blood, Urine TRACE-INTACT (*)     Leukocyte Esterase, Urine SMALL (*)     All other components within normal limits   MICROSCOPIC URINALYSIS - Abnormal; Notable for the following components:    WBC, UA 6-9 (*)     All other components within normal limits   SODIUM, URINE, RANDOM   URIC ACID   OSMOLALITY, URINE   OSMOLALITY   OSMOLALITY   TSH   T4, FREE       When ordered only abnormal lab results are displayed. All other labs were within normal range or not returned as of this dictation. EKG: When ordered, EKG's are interpreted by the Emergency Department Physician in the absence of a cardiologist.  Please see their note for interpretation of EKG. RADIOLOGY:   Non-plain film images such as CT, Ultrasound and MRI are read by the radiologist. Plain radiographic images are visualized and preliminarily interpreted by the ED Provider with the below findings:        Interpretation per the Radiologist below, if available at the time of this note:    No orders to display     No results found. No results found.     PROCEDURES   Unless otherwise noted below, none     Procedures    CRITICAL CARE TIME (.cctime)   I personally saw the patient and independently provided 38 minutes of non-concurrent critical care out of the total shared critical care time provided not including separate billable procedure. The critical condition I managed or considered was severe hyponatremia. PAST MEDICAL HISTORY      has a past medical history of Aneurysm ([address] Colon polyp, Diabetes mellitus ([address] Enlarged prostate, Hyperlipidemia, Hypertension, Reflux, and Wears glasses. EMERGENCY DEPARTMENT COURSE and DIFFERENTIAL DIAGNOSIS/MDM:   Vitals:    Vitals:    03/13/23 2210 03/14/23 0027   BP: (!) 177/83 (!) 150/77   Pulse: 80 87   Resp: 18 11   Temp: 98.1 °F (36.7 °C)    TempSrc: Oral    SpO2: 97% 96%   Weight: 170 lb (77.1 kg)    Height: 5' 9\" (1.753 m)        Patient was given the following medications:  Medications   0.9 % sodium chloride bolus (500 mLs IntraVENous New Bag 3/14/23 0044)   oxyCODONE (ROXICODONE) immediate release tablet 5 mg (has no administration in time range)   oxyCODONE-acetaminophen (PERCOCET) 5-325 MG per tablet 1 tablet (1 tablet Oral Given 3/13/23 2300)   ondansetron (ZOFRAN-ODT) disintegrating tablet 4 mg (4 mg Oral Given 3/13/23 2300)   ciprofloxacin (CIPRO) IVPB 400 mg (400 mg IntraVENous New Bag 3/14/23 0043)             Is this patient to be included in the SEP-1 Core Measure due to severe sepsis or septic shock? No   Exclusion criteria - the patient is NOT to be included for SEP-1 Core Measure due to:  2+ SIRS criteria are not met    Chronic Conditions affecting care:    has a past medical history of Aneurysm ([address] Colon polyp, Diabetes mellitus ([address] Enlarged prostate, Hyperlipidemia, Hypertension, Reflux, and Wears glasses.     CONSULTS: (Who and What was discussed)  IP CONSULT TO NEPHROLOGY  IP CONSULT TO NEPHROLOGY      Social Determinants Significantly Affecting Health : None    Records Reviewed (External and Source) reviewed previous available records    CC/HPI Summary, DDx, ED Course, and Reassessment: Patient presented to the emergency department today with complaints of abdominal pain concern for prostatitis. His urine did show signs of infection concerning for prostatitis however his labs also showed a sodium of 124, he had normal renal function and does not appear to be dehydrated. I did speak with nephrology who did recommend admission, his vital signs been stable. I did communicate all findings with the patient and family and they do agree with plan for admission to the hospital        I am the Primary Clinician of Record. FINAL IMPRESSION      1. Hyponatremia    2. Acute prostatitis          DISPOSITION/PLAN     DISPOSITION Admitted    PATIENT REFERRED TO:  No follow-up provider specified. DISCHARGE MEDICATIONS:  New Prescriptions    No medications on file       DISCONTINUED MEDICATIONS:  Discontinued Medications    DOXYCYCLINE HYCLATE (VIBRAMYCIN) 100 MG CAPSULE    Take 100 mg by mouth as needed (pt sts \"as needed for leg infections\")    LISINOPRIL PO    Take 20 mg by mouth daily.     METFORMIN HCL PO    Take 1,000 mg by mouth daily     METOPROLOL TARTRATE (LOPRESSOR) 50 MG TABLET    Take by mouth              (Please note that portions of this note were completed with a voice recognition program.  Efforts were made to edit the dictations but occasionally words are mis-transcribed.)    JORGE Mathews - CNP (electronically signed)      JORGE Mathews CNP  03/14/23 0636

## 2023-03-14 NOTE — H&P
Hospital Medicine  History and Physical    PCP: Catalina Hart MD  Patient Name: Chad Santos    Date of Service: Pt seen/examined on 3/14/23 and admitted to Inpatient with expected LOS greater than two midnights due to medical therapy    CHIEF COMPLAINT:  Pt c/o prostate pain, concern for prostatitis  HISTORY OF PRESENT ILLNESS: Pt is an 66y.o. year-old male with a history of hypertension, hyperlipidemia, type II diabetes mellitus, acquired hypothyroidism and a previous h/o prostatitis as well as episodes of hypernatremia. He presents to the ER for evaluation following a 2-3 day h/o worsening prostate pain and concern for return of possible concern for prostatitis. On evaluation in the ER he was found to have hyponatremia. Nephrology was consulted by the ER provider and recommended a 500 ml bolus of NS. Nephrology stated that they will see him in the morning. Associated signs and symptoms do not include fever or chills, nausea, vomiting, abdominal pain, hemoptysis, hematochezia, diarrhea or constipation. He is being admitted for further evaluation and treatment. Past Medical History:        Diagnosis Date    Aneurysm ([address]     ABD    Colon polyp     Diabetes mellitus ([address]     Enlarged prostate     Hyperlipidemia     Hypertension     Reflux     Wears glasses        Past Surgical History:        Procedure Laterality Date    CATARACT REMOVAL WITH IMPLANT  8/12    left    COLONOSCOPY      POLYPS    COLONOSCOPY  3/07/2012    COLONOSCOPY  8/18/14    polyps    CYST REMOVAL      EYE SURGERY  7/17/2012    rt. cataract removal w/ IOL    UPPER GASTROINTESTINAL ENDOSCOPY N/A 10/9/2019    UPPER EUS W/ANES.  (11:00) performed by Ruy Arteaga MD at [address] [place] [address]  10/9/2019    EGD DILATION BALLOON performed by Ruy Arteaga MD at [facility]       Allergies:  Penicillins    Medications Prior to Admission:    Prior to Admission medications    Medication Sig Start Date End Date Taking? Authorizing Provider   levothyroxine (SYNTHROID) 137 MCG tablet Take 137 mcg by mouth Daily   Yes Historical Provider, MD   tamsulosin (FLOMAX) 0.4 MG capsule Take 0.4 mg by mouth daily   Yes Historical Provider, MD   omeprazole (PRILOSEC) 20 MG delayed release capsule  10/21/20   Historical Provider, MD   simvastatin (ZOCOR) 40 MG tablet Take by mouth 7/22/20   Historical Provider, MD   Multiple Vitamins-Minerals (THERAPEUTIC MULTIVITAMIN-MINERALS) tablet Take 1 tablet by mouth daily    Historical Provider, MD   polyethylene glycol (MIRALAX) 17 g PACK packet Take 17 g by mouth daily    Historical Provider, MD   aspirin 81 MG EC tablet Take 81 mg by mouth daily. Historical Provider, MD   Metoprolol Tartrate (LOPRESSOR PO) Take 50 mg by mouth 2 times daily  3/10/11   Historical Provider, MD   SIMVASTATIN PO Take 40 mg by mouth daily. 3/10/11   Historical Provider, MD       Family History:       Problem Relation Age of Onset    Cancer Father         LUNG     Social History:   TOBACCO:   reports that he has quit smoking. His smoking use included cigarettes. He has a 51.00 pack-year smoking history. He has never used smokeless tobacco.  ETOH:   reports that he does not currently use alcohol. OCCUPATION:      REVIEW OF SYSTEMS:  A full review of systems was performed and is negative except for that which appears in the HPI    Physical Exam:    Vitals: BP (!) 150/77   Pulse 87   Temp 98.1 °F (36.7 °C) (Oral)   Resp 11   Ht 5' 9\" (1.753 m)   Wt 170 lb (77.1 kg)   SpO2 96%   BMI 25.10 kg/m²   General appearance: WD/WN 66y.o. year-old male who is alert, appears stated age and is cooperative  HEENT: Head: Normocephalic, no lesions, without obvious abnormality. Eye: Normal external eye, conjunctiva, lids cornea, PEERL. Ears: Normal external ears. Non-tender. Nose: Normal external nose, mucus membranes and septum. Pharynx: Dental Hygiene adequate.  Normal buccal mucosa. Normal pharynx. Neck: no adenopathy, no carotid bruit, no JVD, supple, symmetrical, trachea midline and thyroid not enlarged, symmetric, no tenderness/mass/nodules  Lungs: clear to auscultation bilaterally and no use of accessory muscles  Heart: regular rate and rhythm, S1, S2 normal, no murmur, click, rub or gallop and normal apical impulse  Abdomen: soft, non-tender; bowel sounds normal; no masses, no organomegaly  Extremities: extremities atraumatic, no cyanosis or edema and Homans sign is negative, no sign of DVT. Capillary Refill: Acceptable < 3 seconds   Peripheral Pulses: +3 easily felt, not easily obliterated with pressures   Skin: Skin color, texture, turgor normal. No rashes or lesions on exposed skin  Neurologic: Neurovascularly intact without any focal sensory/motor deficits. Cranial nerves: II-XII intact, grossly non-focal. Gait was not tested. Mental Status: Alert and oriented, thought content appropriate, normal insight        CBC:   Recent Labs     03/13/23  2244   WBC 8.6   HGB 14.1        BMP:    Recent Labs     03/13/23  2244   *   K 4.4   CL 92*   CO2 25   BUN 5*   CREATININE 0.7*   GLUCOSE 120*     Troponin: No results for input(s): TROPONINI in the last 72 hours. PT/INR:  No results found for: PTINR  U/A:    Lab Results   Component Value Date/Time    LEUKOCYTESUR SMALL 03/13/2023 10:44 PM    RBCUA 0-2 03/13/2023 10:44 PM    SPECGRAV <=1.005 03/13/2023 10:44 PM    UROBILINOGEN 0.2 03/13/2023 10:44 PM    BILIRUBINUR Negative 03/13/2023 10:44 PM    BLOODU TRACE-INTACT 03/13/2023 10:44 PM    GLUCOSEU Negative 03/13/2023 10:44 PM    PROTEINU Negative 03/13/2023 10:44 PM         RAD:   I have independently reviewed and interpreted the imaging studies below and based my recommendations to the patient on those findings. No results found.       Assessment:   Principal Problem:    Hyponatremia  Active Problems:    Acute cystitis without hematuria    Acquired hypothyroidism    Essential hypertension    Type 2 diabetes mellitus (HCC)    HLD (hyperlipidemia)  Resolved Problems:    * No resolved hospital problems. *      Plan:         Hyponatremia - Nephrology was consulted by the ER provider and recommended a 500 ml bolus of NS. Nephrology stated that they will see him in the morning.  - Checking urine sodium, uric acid, urine osmolality and serum osmolality    Acute cystitis without significant hematuria - patient was started on Cipro empirically. Urine culture and sensitivities have been ordered, and antibiotic therapy will be adjusted as necessary based upon those results. Acquired hypothyroidism - stable; continue Levothyroxine. Will check Thyroid Function Tests. Diabetes mellitus II - Controlled without need for aggressive monitoring or intervention    Essential (primary) hypertension - continue home meds and monitor blood pressure    Hyperlipidemia - No current evidence of Rhabdomyolysis or other adverse effects. Continue statin therapy while in the hospital           Code Status: Full  Diet: General.  Fluid restriction  DVT Prophylaxis: Lovenox    (Advanced care planning has been discussed with patient and/or responsible family member and is reflected in the code status.  Further orders associated with this have been entered if appropriate)    Disposition: Anticipate that patient will remain in the hospital for 2 or more midnights depending on further evaluation and clinical course     Please note that over 50 minutes was spent in evaluating the patient, review of records and results, discussion with staff/family, etc.      Gabi Jack MD

## 2023-03-14 NOTE — PROGRESS NOTES
Comprehensive Nutrition Assessment    Type and Reason for Visit:  Positive Nutrition Screen    Nutrition Recommendations/Plan:   Continue regular 5 carb choice diet  Encourage PO intake   Monitor nutrition adequacy, pertinent labs, bowel habits, wt changes, and clinical progress     Malnutrition Assessment:  Malnutrition Status: At risk for malnutrition (Comment) (03/14/23 1207)    Context:  Chronic Illness     Findings of the 6 clinical characteristics of malnutrition:  Energy Intake:  Mild decrease in energy intake (Comment)  Muscle Mass Loss:  Mild muscle mass loss Temples (temporalis)    Nutrition Assessment:    Positive Malnutrition Screen: 66 y.o m with hx of HTN, DM, hypothyroidism, previous h/o prostatitis, and hypernatremia admitted with hypoatremia. Nephrology following. Pt reports good appetite when not tired and a good appetite PTA. Stating eating but not as much as pt did when younger. Reports eating x 2 meals per day, everyother day drinking 1 boost carton. On regular 5 carb choice diet with 1500 ml FR. No PO intakes per EMR. Pt just sitting up to eat lunch at end of visit. Unable to assess wt loss d/t lack of wt hx in EMR. Pt does not report any recent wt loss. Pt stated weighing 170lbs for the past few years. . Pt unsure of why wt loss occurred. Offered ONS, pt declined. No c/o nausea or consitpation. Encouraged PO intake. Will continue to monitor. Nutrition Related Findings:    Na 128, BUN 4. BG WNL. Wound Type: None       Current Nutrition Intake & Therapies:    Average Meal Intake: Unable to assess  Average Supplements Intake: None Ordered  ADULT DIET; Regular; 5 carb choices (75 gm/meal); 1500 ml    Anthropometric Measures:  Height: 5' 9\" (175.3 cm)  Ideal Body Weight (IBW): 160 lbs (73 kg)       Current Body Weight: 171 lb 8 oz (77.8 kg), 107.2 % IBW.  Weight Source: Standing Scale  Current BMI (kg/m2): 25.3  Usual Body Weight: 225 lb (102.1 kg) (couple years ago per pt)  % Weight Change (Calculated): -23.8  Weight Adjustment For: No Adjustment                 BMI Categories: Overweight (BMI 25.0-29. 9)    Estimated Daily Nutrient Needs:  Energy Requirements Based On: Kcal/kg (20-25 kcal/kg)  Weight Used for Energy Requirements: Current (79kg)  Energy (kcal/day): 6778-1695 kcal  Weight Used for Protein Requirements: Current  Protein (g/day): 79-95 g  Method Used for Fluid Requirements: Other (Comment) (per MD)  Fluid (ml/day): 1500 ml    Nutrition Diagnosis:   Inadequate oral intake related to inadequate protein-energy intake as evidenced by poor intake prior to admission, weight loss, mild muscle loss    Nutrition Interventions:   Food and/or Nutrient Delivery: Continue Current Diet  Nutrition Education/Counseling: No recommendation at this time  Coordination of Nutrition Care: Continue to monitor while inpatient       Goals:     Goals: PO intake 50% or greater, prior to discharge       Nutrition Monitoring and Evaluation:   Behavioral-Environmental Outcomes: None Identified  Food/Nutrient Intake Outcomes: Food and Nutrient Intake  Physical Signs/Symptoms Outcomes: Biochemical Data, Nutrition Focused Physical Findings, Weight    Discharge Planning:    Continue current diet     [address]: Office: [phone]; [address]: [phone]

## 2023-03-15 LAB
ANION GAP SERPL CALCULATED.3IONS-SCNC: 9 MMOL/L (ref 3–16)
BASOPHILS # BLD: 0 K/UL (ref 0–0.2)
BASOPHILS NFR BLD: 0.4 %
BUN SERPL-MCNC: 7 MG/DL (ref 7–20)
CALCIUM SERPL-MCNC: 8.6 MG/DL (ref 8.3–10.6)
CHLORIDE SERPL-SCNC: 92 MMOL/L (ref 99–110)
CO2 SERPL-SCNC: 22 MMOL/L (ref 21–32)
CREAT SERPL-MCNC: 0.6 MG/DL (ref 0.8–1.3)
DEPRECATED RDW RBC AUTO: 14.1 % (ref 12.4–15.4)
EOSINOPHIL # BLD: 0 K/UL (ref 0–0.6)
EOSINOPHIL NFR BLD: 0 %
GFR SERPLBLD CREATININE-BSD FMLA CKD-EPI: >60 ML/MIN/{1.73_M2}
GLUCOSE BLD-MCNC: 104 MG/DL (ref 70–99)
GLUCOSE BLD-MCNC: 109 MG/DL (ref 70–99)
GLUCOSE BLD-MCNC: 112 MG/DL (ref 70–99)
GLUCOSE BLD-MCNC: 137 MG/DL (ref 70–99)
GLUCOSE SERPL-MCNC: 109 MG/DL (ref 70–99)
HCT VFR BLD AUTO: 40.5 % (ref 40.5–52.5)
HGB BLD-MCNC: 13.7 G/DL (ref 13.5–17.5)
LYMPHOCYTES # BLD: 0.4 K/UL (ref 1–5.1)
LYMPHOCYTES NFR BLD: 4 %
MCH RBC QN AUTO: 29.7 PG (ref 26–34)
MCHC RBC AUTO-ENTMCNC: 33.8 G/DL (ref 31–36)
MCV RBC AUTO: 88 FL (ref 80–100)
MONOCYTES # BLD: 1.1 K/UL (ref 0–1.3)
MONOCYTES NFR BLD: 10.5 %
NEUTROPHILS # BLD: 8.8 K/UL (ref 1.7–7.7)
NEUTROPHILS NFR BLD: 85.1 %
PERFORMED ON: ABNORMAL
PLATELET # BLD AUTO: 164 K/UL (ref 135–450)
PMV BLD AUTO: 8.2 FL (ref 5–10.5)
POTASSIUM SERPL-SCNC: 3.9 MMOL/L (ref 3.5–5.1)
RBC # BLD AUTO: 4.61 M/UL (ref 4.2–5.9)
SODIUM SERPL-SCNC: 123 MMOL/L (ref 136–145)
WBC # BLD AUTO: 10.3 K/UL (ref 4–11)

## 2023-03-15 PROCEDURE — 93005 ELECTROCARDIOGRAM TRACING: CPT | Performed by: HOSPITALIST

## 2023-03-15 PROCEDURE — 2580000003 HC RX 258: Performed by: INTERNAL MEDICINE

## 2023-03-15 PROCEDURE — 2500000003 HC RX 250 WO HCPCS: Performed by: HOSPITALIST

## 2023-03-15 PROCEDURE — 2580000003 HC RX 258: Performed by: HOSPITALIST

## 2023-03-15 PROCEDURE — 85025 COMPLETE CBC W/AUTO DIFF WBC: CPT

## 2023-03-15 PROCEDURE — 36415 COLL VENOUS BLD VENIPUNCTURE: CPT

## 2023-03-15 PROCEDURE — 80048 BASIC METABOLIC PNL TOTAL CA: CPT

## 2023-03-15 PROCEDURE — 6360000002 HC RX W HCPCS: Performed by: INTERNAL MEDICINE

## 2023-03-15 PROCEDURE — 1200000000 HC SEMI PRIVATE

## 2023-03-15 PROCEDURE — 6370000000 HC RX 637 (ALT 250 FOR IP): Performed by: HOSPITALIST

## 2023-03-15 PROCEDURE — 6370000000 HC RX 637 (ALT 250 FOR IP): Performed by: INTERNAL MEDICINE

## 2023-03-15 PROCEDURE — 2060000000 HC ICU INTERMEDIATE R&B

## 2023-03-15 RX ORDER — DILTIAZEM HYDROCHLORIDE 5 MG/ML
10 INJECTION INTRAVENOUS ONCE
Status: COMPLETED | OUTPATIENT
Start: 2023-03-15 | End: 2023-03-15

## 2023-03-15 RX ORDER — FUROSEMIDE 20 MG/1
20 TABLET ORAL DAILY
Status: DISCONTINUED | OUTPATIENT
Start: 2023-03-15 | End: 2023-03-17 | Stop reason: HOSPADM

## 2023-03-15 RX ORDER — SODIUM CHLORIDE 1000 MG
2 TABLET, SOLUBLE MISCELLANEOUS
Status: DISCONTINUED | OUTPATIENT
Start: 2023-03-15 | End: 2023-03-16

## 2023-03-15 RX ADMIN — POLYETHYLENE GLYCOL 3350 17 G: 17 POWDER, FOR SOLUTION ORAL at 23:53

## 2023-03-15 RX ADMIN — SODIUM CHLORIDE 2.5 MG/HR: 900 INJECTION, SOLUTION INTRAVENOUS at 22:27

## 2023-03-15 RX ADMIN — APIXABAN 5 MG: 5 TABLET, FILM COATED ORAL at 20:12

## 2023-03-15 RX ADMIN — METOPROLOL TARTRATE 50 MG: 50 TABLET, FILM COATED ORAL at 09:30

## 2023-03-15 RX ADMIN — Medication 1 G: at 13:00

## 2023-03-15 RX ADMIN — SODIUM CHLORIDE, PRESERVATIVE FREE 10 ML: 5 INJECTION INTRAVENOUS at 21:30

## 2023-03-15 RX ADMIN — ATORVASTATIN CALCIUM 20 MG: 10 TABLET, FILM COATED ORAL at 20:12

## 2023-03-15 RX ADMIN — SODIUM CHLORIDE, PRESERVATIVE FREE 10 ML: 5 INJECTION INTRAVENOUS at 09:31

## 2023-03-15 RX ADMIN — Medication 2 G: at 18:31

## 2023-03-15 RX ADMIN — FUROSEMIDE 20 MG: 20 TABLET ORAL at 18:31

## 2023-03-15 RX ADMIN — METOPROLOL TARTRATE 50 MG: 50 TABLET, FILM COATED ORAL at 20:12

## 2023-03-15 RX ADMIN — DILTIAZEM HYDROCHLORIDE 10 MG: 5 INJECTION INTRAVENOUS at 19:42

## 2023-03-15 RX ADMIN — ENOXAPARIN SODIUM 40 MG: 100 INJECTION SUBCUTANEOUS at 09:28

## 2023-03-15 RX ADMIN — LEVOTHYROXINE SODIUM 137 MCG: 112 TABLET ORAL at 05:08

## 2023-03-15 RX ADMIN — TAMSULOSIN HYDROCHLORIDE 0.4 MG: 0.4 CAPSULE ORAL at 09:29

## 2023-03-15 RX ADMIN — NIFEDIPINE 30 MG: 30 TABLET, FILM COATED, EXTENDED RELEASE ORAL at 09:29

## 2023-03-15 RX ADMIN — Medication 1 G: at 09:29

## 2023-03-15 RX ADMIN — CIPROFLOXACIN 400 MG: 2 INJECTION, SOLUTION INTRAVENOUS at 13:56

## 2023-03-15 ASSESSMENT — PAIN SCALES - GENERAL
PAINLEVEL_OUTOF10: 0
PAINLEVEL_OUTOF10: 0

## 2023-03-15 NOTE — PROGRESS NOTES
Hospitalist Progress Note      PCP: Monica Lindsay MD    Date of Admission: 3/13/2023    Chief Complaint:     Hospital Course:     Subjective:     Patient feels better today with no complaints. No dysuria frequency no pelvic pain. No fevers, chills or rigors      Medications:  Reviewed    Infusion Medications    dextrose      sodium chloride       Scheduled Medications    ciprofloxacin  400 mg IntraVENous Q12H    tamsulosin  0.4 mg Oral Daily    atorvastatin  20 mg Oral Nightly    metoprolol tartrate  50 mg Oral BID    levothyroxine  137 mcg Oral Daily    sodium chloride flush  10 mL IntraVENous 2 times per day    enoxaparin  40 mg SubCUTAneous Daily    insulin lispro  0-8 Units SubCUTAneous TID WC    insulin lispro  0-4 Units SubCUTAneous Nightly    sodium chloride  1 g Oral TID WC    NIFEdipine  30 mg Oral Daily     PRN Meds: glucose, dextrose bolus **OR** dextrose bolus, glucagon (rDNA), dextrose, sodium chloride flush, sodium chloride, ondansetron, polyethylene glycol, acetaminophen **OR** acetaminophen, oxyCODONE, benzonatate      Intake/Output Summary (Last 24 hours) at 3/15/2023 0953  Last data filed at 3/15/2023 0930  Gross per 24 hour   Intake 360 ml   Output 1370 ml   Net -1010 ml       Physical Exam Performed:    /72   Pulse 79   Temp 98.6 °F (37 °C) (Oral)   Resp 16   Ht 5' 9\" (1.753 m)   Wt 171 lb 8 oz (77.8 kg)   SpO2 92%   BMI 25.33 kg/m²     General appearance: No apparent distress, appears stated age and cooperative. HEENT: Pupils equal, round, and reactive to light. Conjunctivae/corneas clear. Neck: Supple, with full range of motion. No jugular venous distention. Trachea midline. Respiratory:  Normal respiratory effort. Clear to auscultation, bilaterally without Rales/Wheezes/Rhonchi. Cardiovascular: Regular rate and rhythm with normal S1/S2 without murmurs, rubs or gallops. Abdomen: Soft, non-tender, non-distended with normal bowel sounds.   Musculoskeletal: No clubbing, cyanosis or edema bilaterally. Full range of motion without deformity. Skin: Skin color, texture, turgor normal.  No rashes or lesions. Neurologic:  Neurovascularly intact without any focal sensory/motor deficits. Cranial nerves: II-XII intact, grossly non-focal.  Psychiatric: Alert and oriented, thought content appropriate, normal insight  Capillary Refill: Brisk, 3 seconds, normal   Peripheral Pulses: +2 palpable, equal bilaterally       Labs:   Recent Labs     03/13/23 2244 03/14/23  0528 03/15/23  0501   WBC 8.6 8.9 10.3   HGB 14.1 14.2 13.7   HCT 42.8 42.8 40.5    154 164     Recent Labs     03/13/23 2244 03/14/23  0528 03/15/23  0501   * 128* 123*   K 4.4 3.7 3.9   CL 92* 95* 92*   CO2 25 24 22   BUN 5* 4* 7   CREATININE 0.7* 0.6* 0.6*   CALCIUM 9.1 8.9 8.6     Recent Labs     03/13/23  2244   AST 18   ALT 12   BILITOT 0.6   ALKPHOS 68     No results for input(s): INR in the last 72 hours. No results for input(s): [place] in the last 72 hours. Urinalysis:      Lab Results   Component Value Date/Time    NITRU Negative 03/13/2023 10:44 PM    WBCUA 6-9 03/13/2023 10:44 PM    RBCUA 0-2 03/13/2023 10:44 PM    BLOODU TRACE-INTACT 03/13/2023 10:44 PM    SPECGRAV <=1.005 03/13/2023 10:44 PM    GLUCOSEU Negative 03/13/2023 10:44 PM       Radiology:  No orders to display       IP CONSULT TO NEPHROLOGY  IP CONSULT TO NEPHROLOGY    Assessment/Plan:      -Hyponatremia. .. 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 Lucyann Push Sodium down to 123 this morning. .. Continue salt tablets and fluid restriction. .. Nephrology consult appreciated    -UTI, concern for prostatitis--symptoms resolved within 24 hours of presentation--presented with had pelvic pain reminiscent of his prior prostatitis--currently on ciprofloxacin--I do not see that a urine culture was obtained on patient    -History of prostate cancer ,BPH s/p TURP. Lucyann Push Patient occasionally requires self-catheterization . .. Continue Flomax. .  Follow-up with urology    -History of thyroid cancer status postresection, history of esophageal cancer s/p chemoradiation. .  In remission--followed by oncology    -Hypothyroidism-postsurgical-TSH is suppressed--decrease dose of Synthroid and repeat TSH in 6 weeks    -Essential hypertension, uncontrolled on presentation--improved--on metoprolol plus nifedipine added this admission    -Hyperlipidemia-continue statin      DVT Prophylaxis:   Diet: ADULT DIET;  Regular; 5 carb choices (75 gm/meal); 1500 ml  Code Status: Full Code  PT/OT Eval Status:     Dispo -Home once sodium improves, hopefully within the next 48 hours          Angie Maharaj MD

## 2023-03-15 NOTE — PLAN OF CARE
Problem: Safety - Adult  Goal: Free from fall injury  Outcome: Progressing  Flowsheets (Taken 3/15/2023 1230)  Free From Fall Injury: Instruct family/caregiver on patient safety     Problem: Nutrition Deficit:  Goal: Optimize nutritional status  Outcome: Progressing  Flowsheets (Taken 3/15/2023 1230)  Nutrient intake appropriate for improving, restoring, or maintaining nutritional needs:   Assess nutritional status and recommend course of action   Monitor oral intake, labs, and treatment plans   Provide specific nutrition education to patient or family as appropriate     Problem: Chronic Conditions and Co-morbidities  Goal: Patient's chronic conditions and co-morbidity symptoms are monitored and maintained or improved  Outcome: Progressing  Flowsheets (Taken 3/15/2023 1230)  Care Plan - Patient's Chronic Conditions and Co-Morbidity Symptoms are Monitored and Maintained or Improved:   Monitor and assess patient's chronic conditions and comorbid symptoms for stability, deterioration, or improvement   Collaborate with multidisciplinary team to address chronic and comorbid conditions and prevent exacerbation or deterioration

## 2023-03-15 NOTE — CONSULTS
Interval History and plan:      Sodium coming down to 123  Was on IV fluids off of it and now on salt tablets  Cortisol normal  Blood pressure is under control  Plan:    Increase the salt tablet to 2 -3 times daily  Start on Lasix                        Assessment :     Hyponatremia  Sodium 124 on presentation  No history of alcoholism  Previous smoker  Has history of esophageal and thyroid cancer followed by oncology on surveillance  No active cancer at this time  Urine sodium was 13 on presentation    Echo: 2/20-normal EF, grade 1 diastolic dysfunction    Hypertension   BP: (126-134)/(64-72)  Heart Rate:  [69-79]   BP goal inpatient [phone] systolic inpatient     Prostatitis  On IV ciprofloxacin           [facility] would like to thank Manfred Santana MD   for opportunity to serve this patient      Please call with questions at-   24 Hrs Answering service [phone] or  7 am- 5 pm via Perfect serve or cell phone  Bry Cueto MD          CC/reason for consult :     Hyponatremia     HPI :     Lexis Guerrero is a 66 y.o. male presented to   the hospital on 3/13/2023 with 2 to 3 days of prostate pain, possibly due to the prostatitis. He is known to have recurrent prostatitis. Also known to have hyponatremia and not seen by nephrology. In the ED his sodium was found to be 124 but asymptomatic. He was given 100 500 mL of normal saline bolus  We are consulted for hyponatremia and related issues    Has no nausea vomiting since. Does not take hydrochlorothiazide or chlorthalidone  No fluid overload history  Denies liver problem  Denies alcoholism  He is a previous smoker but is stopped.   He is known to have esophageal cancer and thyroid cancer remission      ROS:     Seen with-no family    positives in bold   Constitutional:  fever, chills, weakness, weight change, fatigue  Skin:  rash, pruritus, hair loss, bruising, dry skin, petechiae  Head, Face, Neck   headaches, swelling, cervical adenopathy  Respiratory: shortness of breath, cough, or wheezing  Cardiovascular: chest pain, palpitations, dizzy, edema  Gastrointestinal: nausea, vomiting, diarrhea, constipation,belly pain    Yellow skin, blood in stool  Musculoskeletal:  back pain, muscle weakness, gait problems,       joint pain or swelling. Genitourinary:  dysuria, poor urine flow, flank pain, blood in urine  Neurologic:  vertigo, TIA'S, syncope, seizures, focal weakness  Psychosocial:  insomnia, anxiety, or depression. Additional positive findings:                    All other remaining systems are negative or unable to obtain        PMH/PSH/SH/Family History:     Past Medical History:   Diagnosis Date    [facility] [address]     ABD    Colon polyp     Diabetes mellitus ([facility]     Enlarged prostate     Hyperlipidemia     Hypertension     Reflux     Wears glasses        Past Surgical History:   Procedure Laterality Date    CATARACT REMOVAL WITH IMPLANT  8/12    left    COLONOSCOPY      POLYPS    COLONOSCOPY  3/07/2012    COLONOSCOPY  8/18/14    polyps    CYST REMOVAL      EYE SURGERY  7/17/2012    rt. cataract removal w/ IOL    UPPER GASTROINTESTINAL ENDOSCOPY N/A 10/9/2019    UPPER EUS W/ANES. (11:00) performed by Adi Álvarez MD at [address] [place] [address]  10/9/2019    EGD DILATION BALLOON performed by Adi Álvarez MD at [id number] [place] [address] Real        reports that he has quit smoking. His smoking use included cigarettes. He has a 51.00 pack-year smoking history. He has never used smokeless tobacco. He reports that he does not currently use alcohol. He reports that he does not use drugs. family history includes Cancer in his father.          Medication:     Current Facility-Administered Medications: [START ON 3/16/2023] levothyroxine (SYNTHROID) tablet 112.5 mcg, 112.5 mcg, Oral, Daily  ciprofloxacin (CIPRO) IVPB 400 mg, 400 mg, IntraVENous, Q12H  tamsulosin (FLOMAX) capsule 0.4 mg, 0.4 mg, Oral, Daily  atorvastatin (LIPITOR) tablet 20 mg, 20 mg, Oral, Nightly  metoprolol tartrate (LOPRESSOR) tablet 50 mg, 50 mg, Oral, BID  glucose chewable tablet 16 g, 4 tablet, Oral, PRN  dextrose bolus 10% 125 mL, 125 mL, IntraVENous, PRN **OR** dextrose bolus 10% 250 mL, 250 mL, IntraVENous, PRN  glucagon (rDNA) injection 1 mg, 1 mg, SubCUTAneous, PRN  dextrose 10 % infusion, , IntraVENous, Continuous PRN  sodium chloride flush 0.9 % injection 10 mL, 10 mL, IntraVENous, 2 times per day  sodium chloride flush 0.9 % injection 10 mL, 10 mL, IntraVENous, PRN  0.9 % sodium chloride infusion, , IntraVENous, PRN  enoxaparin (LOVENOX) injection 40 mg, 40 mg, SubCUTAneous, Daily  ondansetron (ZOFRAN) injection 4 mg, 4 mg, IntraVENous, Q4H PRN  polyethylene glycol (GLYCOLAX) packet 17 g, 17 g, Oral, Daily PRN  acetaminophen (TYLENOL) tablet 650 mg, 650 mg, Oral, Q4H PRN **OR** acetaminophen (TYLENOL) suppository 650 mg, 650 mg, Rectal, Q4H PRN  insulin lispro (HUMALOG) injection vial 0-8 Units, 0-8 Units, SubCUTAneous, TID WC  insulin lispro (HUMALOG) injection vial 0-4 Units, 0-4 Units, SubCUTAneous, Nightly  oxyCODONE (ROXICODONE) immediate release tablet 5 mg, 5 mg, Oral, Q4H PRN  sodium chloride tablet 1 g, 1 g, Oral, TID WC  NIFEdipine (PROCARDIA XL) extended release tablet 30 mg, 30 mg, Oral, Daily  benzonatate (TESSALON) capsule 200 mg, 200 mg, Oral, TID PRN       Vitals :     Vitals:    03/15/23 1534   BP: 126/65   Pulse: 76   Resp: 16   Temp:    SpO2: 90%       I & O :       Intake/Output Summary (Last 24 hours) at 3/15/2023 1631  Last data filed at 3/15/2023 1536  Gross per 24 hour   Intake 600 ml   Output 1650 ml   Net -1050 ml        Physical Examination :     General appearance: Alert and oriented  Respiratory: NAD  Cardiovascular: NAD, Edema NAD  Abdomen: -NAD  Other relevant findings:-       LABS:     Recent Labs     03/13/23  2244 03/14/23  0528 03/15/23  0501   WBC 8.6 8.9 10.3   HGB 14.1 14.2 13.7 HCT 42.8 42.8 40.5    154 164     Recent Labs     03/13/23  2244 03/14/23  0528 03/15/23  0501   * 128* 123*   K 4.4 3.7 3.9   CL 92* 95* 92*   CO2 25 24 22   BUN 5* 4* 7   CREATININE 0.7* 0.6* 0.6*   GLUCOSE 120* 106* 109*            Thanks,   [facility]  [address]  Office: [phone]  Fax: [phone]

## 2023-03-15 NOTE — PROGRESS NOTES
Pt assessment completed and charted. VSS. Pt a/o. Pt denies pain. Pt ambulates independently. Using urinal. 1500 ml fluid restriction. Bed in lowest position and wheels locked. Call light within reach. Bedside table within reach. Non-skid footwear in place. Pt denies any other needs at this time. Pt calls out appropriately.

## 2023-03-15 NOTE — CARE COORDINATION
Patient with new onset a fib [id number]  [id number] score 4     Start cardizem bolus /drip, eliquis    Consult cardiology    Jaiden Robles MD

## 2023-03-15 NOTE — CONSULTS
Consult placed    Carolyn:rhiannon  Date:3/15/2023,  Time:7:17 PM        Electronically signed by Chris Torres on 3/15/2023 at 7:17 PM

## 2023-03-15 NOTE — PROGRESS NOTES
Patient admitted to room 211 from C3. Patient oriented to room, call light, bed rails, phone, lights and bathroom. Patient instructed about the schedule of the day including: vital sign frequency, lab draws, possible tests, frequency of MD and staff rounds, including RN/MD rounding together at bedside, daily weights, and I &O's. Patient instructed about prescribed diet, how to use MENU, and television. bed alarm in place, patient aware of placement and reason. Telemetry box in place, patient aware of placement and reason. Bed locked, in lowest position, side rails up 2/4, call light within reach. Will continue to monitor.

## 2023-03-15 NOTE — PROGRESS NOTES
Perfect serve message sent to Dr. Arturo Liz, \"Pt c/o chest tightness, no pain. EKG ordered. Looks like afib on the monitor. '140's. \", awaiting response.

## 2023-03-15 NOTE — CARE COORDINATION
Hospital day 1: Patient from home with spouse and adult grandson, [de-identified]. Patient with hoyt. Patient on IV ATB. Na 123 this date. SW does not anticipate DCP needs. ESTELA Garrido

## 2023-03-16 LAB
ANION GAP SERPL CALCULATED.3IONS-SCNC: 11 MMOL/L (ref 3–16)
BASOPHILS # BLD: 0 K/UL (ref 0–0.2)
BASOPHILS NFR BLD: 0.2 %
BUN SERPL-MCNC: 8 MG/DL (ref 7–20)
CALCIUM SERPL-MCNC: 8.6 MG/DL (ref 8.3–10.6)
CHLORIDE SERPL-SCNC: 95 MMOL/L (ref 99–110)
CO2 SERPL-SCNC: 23 MMOL/L (ref 21–32)
CREAT SERPL-MCNC: 0.6 MG/DL (ref 0.8–1.3)
DEPRECATED RDW RBC AUTO: 13.9 % (ref 12.4–15.4)
EKG ATRIAL RATE: 80 BPM
EKG ATRIAL RATE: 97 BPM
EKG DIAGNOSIS: NORMAL
EKG DIAGNOSIS: NORMAL
EKG P AXIS: 12 DEGREES
EKG P-R INTERVAL: 142 MS
EKG Q-T INTERVAL: 310 MS
EKG Q-T INTERVAL: 382 MS
EKG QRS DURATION: 68 MS
EKG QRS DURATION: 68 MS
EKG QTC CALCULATION (BAZETT): 440 MS
EKG QTC CALCULATION (BAZETT): 448 MS
EKG R AXIS: 27 DEGREES
EKG R AXIS: 36 DEGREES
EKG T AXIS: 57 DEGREES
EKG T AXIS: 60 DEGREES
EKG VENTRICULAR RATE: 126 BPM
EKG VENTRICULAR RATE: 80 BPM
EOSINOPHIL # BLD: 0 K/UL (ref 0–0.6)
EOSINOPHIL NFR BLD: 0.5 %
GFR SERPLBLD CREATININE-BSD FMLA CKD-EPI: >60 ML/MIN/{1.73_M2}
GLUCOSE BLD-MCNC: 104 MG/DL (ref 70–99)
GLUCOSE BLD-MCNC: 113 MG/DL (ref 70–99)
GLUCOSE BLD-MCNC: 120 MG/DL (ref 70–99)
GLUCOSE BLD-MCNC: 148 MG/DL (ref 70–99)
GLUCOSE SERPL-MCNC: 104 MG/DL (ref 70–99)
HCT VFR BLD AUTO: 40.5 % (ref 40.5–52.5)
HGB BLD-MCNC: 13.6 G/DL (ref 13.5–17.5)
LV EF: 53 %
LVEF MODALITY: NORMAL
LYMPHOCYTES # BLD: 0.5 K/UL (ref 1–5.1)
LYMPHOCYTES NFR BLD: 7 %
MCH RBC QN AUTO: 29.4 PG (ref 26–34)
MCHC RBC AUTO-ENTMCNC: 33.6 G/DL (ref 31–36)
MCV RBC AUTO: 87.5 FL (ref 80–100)
MONOCYTES # BLD: 1 K/UL (ref 0–1.3)
MONOCYTES NFR BLD: 14 %
NEUTROPHILS # BLD: 5.4 K/UL (ref 1.7–7.7)
NEUTROPHILS NFR BLD: 78.3 %
PERFORMED ON: ABNORMAL
PLATELET # BLD AUTO: 167 K/UL (ref 135–450)
PMV BLD AUTO: 7.8 FL (ref 5–10.5)
POTASSIUM SERPL-SCNC: 3.6 MMOL/L (ref 3.5–5.1)
RBC # BLD AUTO: 4.64 M/UL (ref 4.2–5.9)
SODIUM SERPL-SCNC: 129 MMOL/L (ref 136–145)
WBC # BLD AUTO: 6.9 K/UL (ref 4–11)

## 2023-03-16 PROCEDURE — 93010 ELECTROCARDIOGRAM REPORT: CPT | Performed by: INTERNAL MEDICINE

## 2023-03-16 PROCEDURE — C8929 TTE W OR WO FOL WCON,DOPPLER: HCPCS

## 2023-03-16 PROCEDURE — 6370000000 HC RX 637 (ALT 250 FOR IP): Performed by: INTERNAL MEDICINE

## 2023-03-16 PROCEDURE — 6360000002 HC RX W HCPCS: Performed by: INTERNAL MEDICINE

## 2023-03-16 PROCEDURE — 93005 ELECTROCARDIOGRAM TRACING: CPT | Performed by: HOSPITALIST

## 2023-03-16 PROCEDURE — 85025 COMPLETE CBC W/AUTO DIFF WBC: CPT

## 2023-03-16 PROCEDURE — 99222 1ST HOSP IP/OBS MODERATE 55: CPT | Performed by: INTERNAL MEDICINE

## 2023-03-16 PROCEDURE — 2580000003 HC RX 258: Performed by: INTERNAL MEDICINE

## 2023-03-16 PROCEDURE — 6370000000 HC RX 637 (ALT 250 FOR IP): Performed by: HOSPITALIST

## 2023-03-16 PROCEDURE — 2060000000 HC ICU INTERMEDIATE R&B

## 2023-03-16 PROCEDURE — 80048 BASIC METABOLIC PNL TOTAL CA: CPT

## 2023-03-16 PROCEDURE — 36415 COLL VENOUS BLD VENIPUNCTURE: CPT

## 2023-03-16 RX ADMIN — CIPROFLOXACIN 400 MG: 2 INJECTION, SOLUTION INTRAVENOUS at 01:26

## 2023-03-16 RX ADMIN — Medication 2 G: at 08:03

## 2023-03-16 RX ADMIN — TAMSULOSIN HYDROCHLORIDE 0.4 MG: 0.4 CAPSULE ORAL at 08:03

## 2023-03-16 RX ADMIN — APIXABAN 5 MG: 5 TABLET, FILM COATED ORAL at 08:03

## 2023-03-16 RX ADMIN — Medication 10 ML: at 21:01

## 2023-03-16 RX ADMIN — ATORVASTATIN CALCIUM 20 MG: 10 TABLET, FILM COATED ORAL at 21:01

## 2023-03-16 RX ADMIN — METOPROLOL TARTRATE 50 MG: 50 TABLET, FILM COATED ORAL at 21:01

## 2023-03-16 RX ADMIN — LEVOTHYROXINE SODIUM 112.5 MCG: 0.1 TABLET ORAL at 05:42

## 2023-03-16 RX ADMIN — CIPROFLOXACIN 400 MG: 2 INJECTION, SOLUTION INTRAVENOUS at 13:45

## 2023-03-16 RX ADMIN — METOPROLOL TARTRATE 50 MG: 50 TABLET, FILM COATED ORAL at 08:03

## 2023-03-16 RX ADMIN — OXYCODONE HYDROCHLORIDE 5 MG: 5 TABLET ORAL at 23:35

## 2023-03-16 RX ADMIN — FUROSEMIDE 20 MG: 20 TABLET ORAL at 08:03

## 2023-03-16 RX ADMIN — APIXABAN 5 MG: 5 TABLET, FILM COATED ORAL at 21:01

## 2023-03-16 ASSESSMENT — PAIN SCALES - GENERAL
PAINLEVEL_OUTOF10: 4
PAINLEVEL_OUTOF10: 0

## 2023-03-16 ASSESSMENT — ENCOUNTER SYMPTOMS
ABDOMINAL PAIN: 1
RIGHT EYE: 0
BACK PAIN: 1
SHORTNESS OF BREATH: 0
HEMATEMESIS: 0
LEFT EYE: 0
HEMATOCHEZIA: 0
STRIDOR: 0
WHEEZING: 0

## 2023-03-16 NOTE — PROGRESS NOTES
CMU notified writer of patient converting to sinus rhythm w PACs at this time. EKG obtained.      /65   Pulse 73 Comment: converted to SR  Temp 98.2 °F (36.8 °C) (Oral)   Resp 18   Ht 5' 9\" (1.753 m)   Wt 171 lb 8 oz (77.8 kg)   SpO2 94%   BMI 25.33 kg/m²

## 2023-03-16 NOTE — CONSULTS
Interval History and plan:      Sodium coming up significantly to 129  On Cardizem drip  No swelling  No shortness of breath and lying flat on bed  Blood pressure is good heart rate is coming down  Plan:    His urine sodium was low on presentation  Getting echo which will help to know if he has congestive heart failure  Currently on salt tablet along with furosemide  Stop sodium chloride tablet continue furosemide  Also ordered proBNP                        Assessment :     Hyponatremia  Sodium 124 on presentation  No history of alcoholism  Previous smoker  Has history of esophageal and thyroid cancer followed by oncology on surveillance  No active cancer at this time  Urine sodium was 13 on presentation  But sodium did not get better with IV fluids and was given salt tablet and Lasix    Echo: 2/20-normal EF, grade 1 diastolic dysfunction    Hypertension   BP: (102-130)/(65-78)  Heart Rate:  []   BP goal inpatient 592-430 systolic inpatient       Prostatitis  On IV ciprofloxacin           [facility] would like to thank Orquidea Ordaz MD   for opportunity to serve this patient      Please call with questions at-   24 Hrs Answering service [phone] or  7 am- 5 pm via Perfect serve or cell phone  Chad Copeland MD          CC/reason for consult :     Hyponatremia     HPI :     Rosa Gilbert is a 66 y.o. male presented to   the hospital on 3/13/2023 with 2 to 3 days of prostate pain, possibly due to the prostatitis. He is known to have recurrent prostatitis. Also known to have hyponatremia and not seen by nephrology. In the ED his sodium was found to be 124 but asymptomatic. He was given 100 500 mL of normal saline bolus  We are consulted for hyponatremia and related issues    Has no nausea vomiting since. Does not take hydrochlorothiazide or chlorthalidone  No fluid overload history  Denies liver problem  Denies alcoholism  He is a previous smoker but is stopped.   He is known to have esophageal cancer and thyroid cancer remission      ROS:     Seen with-no family    positives in bold   Constitutional:  fever, chills, weakness, weight change, fatigue  Skin:  rash, pruritus, hair loss, bruising, dry skin, petechiae  Head, Face, Neck   headaches, swelling,  cervical adenopathy  Respiratory: shortness of breath, cough, or wheezing  Cardiovascular: chest pain, palpitations, dizzy, edema  Gastrointestinal: nausea, vomiting, diarrhea, constipation,belly pain    Yellow skin, blood in stool  Musculoskeletal:  back pain, muscle weakness, gait problems,       joint pain or swelling. Genitourinary:  dysuria, poor urine flow, flank pain, blood in urine  Neurologic:  vertigo, TIA'S, syncope, seizures, focal weakness  Psychosocial:  insomnia, anxiety, or depression. Additional positive findings:                    All other remaining systems are negative or unable to obtain        PMH/PSH/SH/Family History:     Past Medical History:   Diagnosis Date    Aneurysm ([facility] [address]     ABD    Colon polyp     Diabetes mellitus ([facility] [address]     Enlarged prostate     Hyperlipidemia     Hypertension     Reflux     Wears glasses        Past Surgical History:   Procedure Laterality Date    CATARACT REMOVAL WITH IMPLANT  8/12    left    COLONOSCOPY      POLYPS    COLONOSCOPY  3/07/2012    COLONOSCOPY  8/18/14    polyps    CYST REMOVAL      EYE SURGERY  7/17/2012    rt. cataract removal w/ IOL    UPPER GASTROINTESTINAL ENDOSCOPY N/A 10/9/2019    UPPER EUS W/ANES. (11:00) performed by Thanh Jaramillo MD at [address] [facility]  10/9/2019    EGD DILATION BALLOON performed by Thanh Jaramillo MD at [address] [facility]        reports that he has quit smoking. His smoking use included cigarettes. He has a 51.00 pack-year smoking history. He has never used smokeless tobacco. He reports that he does not currently use alcohol. He reports that he does not use drugs.     family history includes Cancer in his father.          Medication:     Current Facility-Administered Medications: sodium chloride tablet 2 g, 2 g, Oral, TID WC  furosemide (LASIX) tablet 20 mg, 20 mg, Oral, Daily  levothyroxine (SYNTHROID) tablet 112.5 mcg, 112.5 mcg, Oral, Daily  apixaban (ELIQUIS) tablet 5 mg, 5 mg, Oral, BID  dilTIAZem 100 mg in sodium chloride 0.9 % 100 mL infusion (ADD-[place]), 2.5-15 mg/hr, IntraVENous, Continuous  ciprofloxacin (CIPRO) IVPB 400 mg, 400 mg, IntraVENous, Q12H  tamsulosin (FLOMAX) capsule 0.4 mg, 0.4 mg, Oral, Daily  atorvastatin (LIPITOR) tablet 20 mg, 20 mg, Oral, Nightly  metoprolol tartrate (LOPRESSOR) tablet 50 mg, 50 mg, Oral, BID  glucose chewable tablet 16 g, 4 tablet, Oral, PRN  dextrose bolus 10% 125 mL, 125 mL, IntraVENous, PRN **OR** dextrose bolus 10% 250 mL, 250 mL, IntraVENous, PRN  glucagon (rDNA) injection 1 mg, 1 mg, SubCUTAneous, PRN  dextrose 10 % infusion, , IntraVENous, Continuous PRN  sodium chloride flush 0.9 % injection 10 mL, 10 mL, IntraVENous, 2 times per day  sodium chloride flush 0.9 % injection 10 mL, 10 mL, IntraVENous, PRN  0.9 % sodium chloride infusion, , IntraVENous, PRN  ondansetron (ZOFRAN) injection 4 mg, 4 mg, IntraVENous, Q4H PRN  polyethylene glycol (GLYCOLAX) packet 17 g, 17 g, Oral, Daily PRN  acetaminophen (TYLENOL) tablet 650 mg, 650 mg, Oral, Q4H PRN **OR** acetaminophen (TYLENOL) suppository 650 mg, 650 mg, Rectal, Q4H PRN  insulin lispro (HUMALOG) injection vial 0-8 Units, 0-8 Units, SubCUTAneous, TID WC  insulin lispro (HUMALOG) injection vial 0-4 Units, 0-4 Units, SubCUTAneous, Nightly  oxyCODONE (ROXICODONE) immediate release tablet 5 mg, 5 mg, Oral, Q4H PRN  benzonatate (TESSALON) capsule 200 mg, 200 mg, Oral, TID PRN       Vitals :     Vitals:    03/16/23 0802   BP: 130/74   Pulse: 85   Resp: 20   Temp: 98.2 °F (36.8 °C)   SpO2: 95%       I & O :       Intake/Output Summary (Last 24 hours) at 3/16/2023 0350  Last data filed at 3/16/2023 0802  Gross per 24 hour   Intake 840 ml   Output 2200 ml   Net -1360 ml          Physical Examination :     General appearance: Alert and oriented  Respiratory: NAD  Cardiovascular: NAD, Edema NAD  Abdomen: -NAD  Other relevant findings:-       LABS:     Recent Labs     03/14/23  0528 03/15/23  0501 03/16/23  0556   WBC 8.9 10.3 6.9   HGB 14.2 13.7 13.6   HCT 42.8 40.5 40.5    164 167       Recent Labs     03/14/23  0528 03/15/23  0501 03/16/23  0556   * 123* 129*   K 3.7 3.9 3.6   CL 95* 92* 95*   CO2 24 22 23   BUN 4* 7 8   CREATININE 0.6* 0.6* 0.6*   GLUCOSE 106* 109* 104*              Thanks,   [facility]  [address] [facility] [address]  Office: [phone]  Fax: [phone]

## 2023-03-16 NOTE — PLAN OF CARE
Problem: Pain  Goal: Verbalizes/displays adequate comfort level or baseline comfort level  Outcome: Progressing  Note: Pt will be satisfied with pain control. Pt uses numeric pain rating scale with reassessments after pain med administration. Will continue to monitor progression throughout shift. Problem: Safety - Adult  Goal: Free from fall injury  Outcome: Progressing  Note: Pt will remain free from falls throughout hospital stay. Fall precautions in place, bed in lowest position with wheels locked and side rails 2/4 up. Room door open and hourly rounding completed. Will continue to monitor throughout shift.

## 2023-03-16 NOTE — CONSULTS
Cardiac Electrophysiology Consult Note      Physician requesting consult: Erica Tubbs MD   Reason for Consult: atrial fibrillation  Admission Date: 3/13/2023  Room/Bed:  [address] [place] [address]    Assessment:     1. Atrial fibrillation: patient has first documented episode of atrial fibrillation. Associated symptoms:  chest discomfort      History of cardioversion: No prior history of cardioversion  History of AF ablation: No history of atrial fibrillation ablation in the past  History of heart surgery/procedure: no    Current use of anti-arrhythmic drugs: Not currently on anti-arrhythmic drugs  Previous use of anti-arrhythmic drugs: Not previously on any anti-arrhythmic drugs    Overall LV function: Normal left ventricular systolic function  Size of left atrium: Normal LA size  Significant cardiac valvular disease: No significant valve disease  Family history of atrial fibrillation: Unknown    Alcohol consumption:  occasional drinks (1-2 times per week)  Caffeine consumption: Heavy caffeine intake (multiple cups of coffee per day)  Smoking status: former smoker, quit many years ago  Obstructive sleep apnea: Suspected, not yet tested  Exercise status: Does not exercise regularly (sedentary lifestyle)    I had a detailed discussion with the patient about issues related to atrial fibrillation (including etiology, disease progression patterns, stroke risk and rate control issues). Patient had his questions answered to his satisfaction. Patient has a [id number] score of 4 [Age over 75 (2 points), Diabetes Mellitus (1 point), and Hypertension (1 point)]  Longterm anticoagulation is: recommended  Current anticoagulation: Apixaban  Bleeding issues reported: no  Renal function: Normal  Thyroid function: Abnormal  TSH <0.01    First documented episode of atrial fibrillation but patient previously informed by one of his physician (years ago) that he had an irregular heart beat and should minimize alcohol intake. His episode occurred in setting of hyperthyroidism (TSH <0.1) and acute illness. It lasted just under 12 hours. Mild associated symptoms (chest discomfort/indigestion). Previously with unremarkable overall cardiac structure and function by echocardiography (2020). New echocardiogram done today and results pending. Would continue current dose of metoprolol and treat his thyroid condition (as is being done). Would arrange outpatient cardiology follow-up and a 2-week event monitor on discharge. Abstinence from alcohol strongly recommended. Consider outpatient sleep study. Consider stress test as outpatient depending on how patient does (unremarkable stress test in 2021 but multiple CV risk factors). 2. Hypertension: good overall blood pressure control    3. Diabetes mellitus: reports good overall control recently. Most recently available HA1C was 6.4. management as per primary team    4. Hyperthyroidism: status post thyroid cancer and reported thyroidectomy. No replacement hormone. Dose adjusted during admission. Plan:     1. Stop diltiazem drip  2. Continue metoprolol 50 mg BID  3. Continue apixaban as prescribed  4. Follow up echocardiogram   5. Telemetry monitoring while inpatient  6. Cardiology follow up after discharge (likely event monitor)    Subjective:     History of Present Illness:    Patient is a 66 y.o. male with past medical history significant for hypertension, diabetes mellitus, thyroid disease and dyslipidemia. Admitted via ED on 3/14/2023 with complaint of abdominal pain. History of prostate cancer and BPH with episodes of prostatitis. While admitted and being treated (including for hyperthyroidism), patient developed atrial fibrillation with rapid ventricular response last night. The episode lasted just over 11 hours and he only felt slighly discomfort in the chest (similar to \"indigestion\"). No magdiel chest pain.  Denies any recent issues of significant shortness of breath, dyspnea on exertion, dizziness or episodes of syncope. Does not feel palpitations. No reported leg swelling  No bleeding issues    He does not exercise regularly but remains active and has no exertional symptoms overall. Lost weight over the past few years in part due to decreased appetite    Drinks 1-2 alcoholic beverages per week  Heavy coffee drinker  Non-smoker at present    Unaware of family history of heart disease. Problem List:  Patient Active Problem List   Diagnosis    Cellulitis    Essential hypertension    Type 2 diabetes mellitus (HCC)    HLD (hyperlipidemia)    BPH (benign prostatic hyperplasia)    Hyponatremia    Smoker    Lymphedema    Acute cystitis without hematuria    Acquired hypothyroidism       History:  Past Medical History:   Diagnosis Date    Aneurysm ([address]     ABD    Colon polyp     Diabetes mellitus ([facility] [address]     Enlarged prostate     Hyperlipidemia     Hypertension     Reflux     Wears glasses      Past Surgical History:   Procedure Laterality Date    CATARACT REMOVAL WITH IMPLANT  8/12    left    COLONOSCOPY      POLYPS    COLONOSCOPY  3/07/2012    COLONOSCOPY  8/18/14    polyps    CYST REMOVAL      EYE SURGERY  7/17/2012    rt. cataract removal w/ IOL    UPPER GASTROINTESTINAL ENDOSCOPY N/A 10/9/2019    UPPER EUS W/ANES.  (11:00) performed by Foster Hidalgo MD at [facility]  10/9/2019    EGD DILATION BALLOON performed by Foster Hidalgo MD at [address] [place] [address] ENDOSCOPY     Family History   Problem Relation Age of Onset    Cancer Father         LUNG     Social History     Socioeconomic History    Marital status:      Spouse name: None    Number of children: None    Years of education: None    Highest education level: None   Tobacco Use    Smoking status: Former     Packs/day: 1.00     Years: 51.00     Pack years: 51.00     Types: Cigarettes    Smokeless tobacco: Never    Tobacco comments:     stopped 02/20/20 Vaping Use    Vaping Use: Former    Substances: Always   Substance and Sexual Activity    Alcohol use: Not Currently     Comment: occ    Drug use: No         Review of Systems:  Review of Systems   Constitutional: Positive for decreased appetite, malaise/fatigue and weight loss. Negative for weight gain. HENT:  Negative for nosebleeds and stridor. Eyes:  Negative for vision loss in left eye and vision loss in right eye. Cardiovascular:  Positive for chest pain (chest discomfort). Negative for dyspnea on exertion, leg swelling and syncope. Respiratory:  Negative for shortness of breath and wheezing. Hematologic/Lymphatic: Negative for bleeding problem. Does not bruise/bleed easily. Skin:  Negative for itching and rash. Musculoskeletal:  Positive for back pain. Negative for joint pain and joint swelling. Gastrointestinal:  Positive for abdominal pain. Negative for hematemesis and hematochezia. Genitourinary:  Positive for dysuria. Negative for hematuria. Neurological:  Negative for dizziness and light-headedness. Psychiatric/Behavioral:  Negative for altered mental status. The patient is not nervous/anxious.       Objective:     Vital Signs (last 24 hours):  Patient Vitals for the past 24 hrs:   BP Temp Temp src Pulse Resp SpO2   03/16/23 1220 106/63 98.1 °F (36.7 °C) Oral 64 18 93 %   03/16/23 0802 130/74 98.2 °F (36.8 °C) Oral 85 20 95 %   03/16/23 0628 112/72 -- -- 76 -- 95 %   03/16/23 0547 115/78 -- -- 78 -- --   03/16/23 0445 102/65 98.2 °F (36.8 °C) Oral 73 18 94 %   03/16/23 0322 -- -- -- (!) 107 -- --   03/16/23 0133 -- -- -- 93 -- --   03/16/23 0058 -- -- -- (!) 110 -- --   03/16/23 0034 -- -- -- (!) 147 -- --   03/16/23 0005 -- -- -- (!) 122 -- --   03/15/23 2340 124/75 98.6 °F (37 °C) Oral 96 16 95 %   03/15/23 2310 122/82 -- -- (!) 105 -- --   03/15/23 2255 122/82 -- -- (!) 102 -- --   03/15/23 2240 123/69 -- -- (!) 108 -- --   03/15/23 2225 104/86 -- -- (!) 114 -- --   03/15/23 2120 -- -- -- (!) 142 -- --   03/15/23 2117 125/76 -- -- 96 -- --   03/15/23 2100 117/78 -- -- 94 -- --   03/15/23 2029 120/73 -- -- 97 -- --   03/15/23 2015 122/72 -- -- (!) 103 -- --   03/15/23 2011 108/71 -- -- (!) 110 -- --   03/15/23 1954 116/72 -- -- (!) 112 -- --   03/15/23 1943 122/79 99 °F (37.2 °C) Oral (!) 127 18 92 %   03/15/23 1855 107/69 97.9 °F (36.6 °C) Oral (!) 124 18 91 %   03/15/23 1534 126/65 -- -- 76 16 90 %         Physical Examination:     Physical Exam  Constitutional:       Appearance: Normal appearance. HENT:      Head: Normocephalic and atraumatic. Nose: Nose normal. No rhinorrhea. Eyes:      General: No scleral icterus. Conjunctiva/sclera: Conjunctivae normal.   Cardiovascular:      Rate and Rhythm: Normal rate and regular rhythm. Pulmonary:      Effort: Pulmonary effort is normal.      Breath sounds: Normal breath sounds. Abdominal:      General: There is no distension. Musculoskeletal:         General: Normal range of motion. Cervical back: Normal range of motion and neck supple. Skin:     General: Skin is warm and dry. Neurological:      General: No focal deficit present. Mental Status: He is alert and oriented to person, place, and time.    Psychiatric:         Mood and Affect: Mood normal.         Behavior: Behavior normal.       03/14 1901 - 03/16 0700  In: 960 [P.O.:960]  Out: 2800 [Urine:2800]    Medications:    Current Facility-Administered Medications   Medication Dose Route Frequency Provider Last Rate Last Admin    furosemide (LASIX) tablet 20 mg  20 mg Oral Daily Humphrey Zapata MD   20 mg at 03/16/23 0803    levothyroxine (SYNTHROID) tablet 112.5 mcg  112.5 mcg Oral Daily Yannick Priest MD   112.5 mcg at 03/16/23 0542    apixaban (ELIQUIS) tablet 5 mg  5 mg Oral BID Yannick Priest MD   5 mg at 03/16/23 1197    dilTIAZem 100 mg in sodium chloride 0.9 % 100 mL infusion (ADD-[place])  2.5-15 mg/hr IntraVENous Continuous Elenita Nicole, MD 2.5 mL/hr at 03/15/23 2227 2.5 mg/hr at 03/15/23 2227    ciprofloxacin (CIPRO) IVPB 400 mg  400 mg IntraVENous Q12H Meme Coelho MD   Stopped at 03/16/23 0236    tamsulosin (FLOMAX) capsule 0.4 mg  0.4 mg Oral Daily Meme Coelho MD   0.4 mg at 03/16/23 0803    atorvastatin (LIPITOR) tablet 20 mg  20 mg Oral Nightly Meme Coelho MD   20 mg at 03/15/23 2012    metoprolol tartrate (LOPRESSOR) tablet 50 mg  50 mg Oral BID Meme Coelho MD   50 mg at 03/16/23 0803    glucose chewable tablet 16 g  4 tablet Oral PRN Meme Coelho MD        dextrose bolus 10% 125 mL  125 mL IntraVENous PRN Meme Coelho MD        Or    dextrose bolus 10% 250 mL  250 mL IntraVENous PRN Meme Coelho MD        glucagon (rDNA) injection 1 mg  1 mg SubCUTAneous PRN Meme Coelho MD        dextrose 10 % infusion   IntraVENous Continuous PRN Meme Coelho MD        sodium chloride flush 0.9 % injection 10 mL  10 mL IntraVENous 2 times per day Meme Coelho MD   10 mL at 03/15/23 2130    sodium chloride flush 0.9 % injection 10 mL  10 mL IntraVENous PRN Meme Coelho MD        0.9 % sodium chloride infusion   IntraVENous PRN Meme Coelho MD        ondansetron [facility]) injection 4 mg  4 mg IntraVENous Q4H PRN Meme Coelho MD        polyethylene glycol (GLYCOLAX) packet 17 g  17 g Oral Daily PRN Meme Coelho MD   17 g at 03/15/23 2353    acetaminophen (TYLENOL) tablet 650 mg  650 mg Oral Q4H PRN Meme Coelho MD        Or    acetaminophen (TYLENOL) suppository 650 mg  650 mg Rectal Q4H PRN Meme Coelho MD        insulin lispro (HUMALOG) injection vial 0-8 Units  0-8 Units SubCUTAneous TID WC Meme Coelho MD        insulin lispro (HUMALOG) injection vial 0-4 Units  0-4 Units SubCUTAneous Nightly Meme Coelho MD        oxyCODONE (ROXICODONE) immediate release tablet 5 mg  5 mg Oral Q4H PRN Meme Coelho MD   5 mg at 03/14/23 1437    benzonatate (TESSALON) capsule 200 mg  200 mg Oral TID PRN JORGE Martinez - CNP   200 mg at 03/14/23 2312         ECG Interpretation:  (Date: 3/15/2023)  Rhythm: Atrial fibrillation  Rate: 126 BPM  PAC's / PVC's present: None  Conduction abnormalities:  none  Axis: normal      Stress test (2021)    STING ECG:  Sinus rhythm   STRESS ECG:  No ischemia   PERFUSION:  Normal   GATING:  Normal     The Gated wall motion calculated ejection fraction was 56%. Total Frames per scan 32. Good quality study   Low risk study   No attenuation artifact     Echocardiogram: (2020)    Study Conclusions     - Left ventricle: The cavity size is normal. Wall thickness is normal. Systolic function was normal.     The estimated ejection fraction was in the range of 55% to 60%. Wall motion was normal; there were     no regional wall motion abnormalities. Doppler parameters are consistent with abnormal left     ventricular relaxation (grade 1 diastolic dysfunction). - Aortic valve: Trileaflet; moderately thickened, moderately calcified leaflets. Transvalvular     velocity is minimally increased. There is no stenosis. - Right ventricle: Systolic function was normal by objective interpretation. TAPSE: 2.6cm. Telemetry:    Sinus rhythm with PAC's since AF terminated around 4AM    Lab Review:    Recent Labs     03/14/23  0528 03/15/23  0501 03/16/23  0556   WBC 8.9 10.3 6.9   HGB 14.2 13.7 13.6   HCT 42.8 40.5 40.5   MCV 89.3 88.0 87.5    164 167       Recent Labs     03/14/23  0528 03/15/23  0501 03/16/23  0556   * 123* 129*   K 3.7 3.9 3.6   CL 95* 92* 95*   CO2 24 22 23   BUN 4* 7 8   CREATININE 0.6* 0.6* 0.6*       No results for input(s): INR, PROTIME in the last 72 hours. No results for input(s): CKMB, TROPONINI in the last 72 hours. No results for input(s): PROBNP in the last 72 hours. Imaging:    No results found.       Signed by: Kareem Pierre MD

## 2023-03-16 NOTE — PROGRESS NOTES
Hospitalist Progress Note      PCP: Rohith Hogan MD    Date of Admission: 3/13/2023    Chief Complaint:     Hospital Course:     Subjective:     Doing well. No SOB. Appetite stable. Afib RVR overnight, now improved. Medications:  Reviewed    Infusion Medications    dilTIAZem (CARDIZEM) 100 mg in 0.9% sodium chloride 100 mL ([facility]) 2.5 mg/hr (03/15/23 2227)    dextrose      sodium chloride       Scheduled Medications    furosemide  20 mg Oral Daily    levothyroxine  112.5 mcg Oral Daily    apixaban  5 mg Oral BID    ciprofloxacin  400 mg IntraVENous Q12H    tamsulosin  0.4 mg Oral Daily    atorvastatin  20 mg Oral Nightly    metoprolol tartrate  50 mg Oral BID    sodium chloride flush  10 mL IntraVENous 2 times per day    insulin lispro  0-8 Units SubCUTAneous TID WC    insulin lispro  0-4 Units SubCUTAneous Nightly     PRN Meds: glucose, dextrose bolus **OR** dextrose bolus, glucagon (rDNA), dextrose, sodium chloride flush, sodium chloride, ondansetron, polyethylene glycol, acetaminophen **OR** acetaminophen, oxyCODONE, benzonatate      Intake/Output Summary (Last 24 hours) at 3/16/2023 1021  Last data filed at 3/16/2023 1006  Gross per 24 hour   Intake 1200 ml   Output 2450 ml   Net -1250 ml         Physical Exam Performed:    /74   Pulse 85   Temp 98.2 °F (36.8 °C) (Oral)   Resp 20   Ht 5' 9\" (1.753 m)   Wt 171 lb 8 oz (77.8 kg)   SpO2 95%   BMI 25.33 kg/m²     General appearance: No apparent distress, appears stated age and cooperative. HEENT: Pupils equal, round, and reactive to light. Conjunctivae/corneas clear. Neck: Supple, with full range of motion. No jugular venous distention. Trachea midline. Respiratory:  Normal respiratory effort. Clear to auscultation, bilaterally without Rales/Wheezes/Rhonchi. Cardiovascular: Regular rate and rhythm with normal S1/S2 without murmurs, rubs or gallops.   Abdomen: Soft, non-tender, non-distended with normal bowel sounds. Musculoskeletal: No clubbing, cyanosis or edema bilaterally. Full range of motion without deformity. Skin: Skin color, texture, turgor normal.  No rashes or lesions. Neurologic:  Neurovascularly intact without any focal sensory/motor deficits. Cranial nerves: II-XII intact, grossly non-focal.  Psychiatric: Alert and oriented, thought content appropriate, normal insight  Capillary Refill: Brisk, 3 seconds, normal   Peripheral Pulses: +2 palpable, equal bilaterally       Labs:   Recent Labs     03/14/23  0528 03/15/23  0501 03/16/23  0556   WBC 8.9 10.3 6.9   HGB 14.2 13.7 13.6   HCT 42.8 40.5 40.5    164 167       Recent Labs     03/14/23  0528 03/15/23  0501 03/16/23  0556   * 123* 129*   K 3.7 3.9 3.6   CL 95* 92* 95*   CO2 24 22 23   BUN 4* 7 8   CREATININE 0.6* 0.6* 0.6*   CALCIUM 8.9 8.6 8.6       Recent Labs     03/13/23  2244   AST 18   ALT 12   BILITOT 0.6   ALKPHOS 68       No results for input(s): INR in the last 72 hours. No results for input(s): [place] Ill in the last 72 hours. Urinalysis:      Lab Results   Component Value Date/Time    NITRU Negative 03/13/2023 10:44 PM    WBCUA 6-9 03/13/2023 10:44 PM    RBCUA 0-2 03/13/2023 10:44 PM    BLOODU TRACE-INTACT 03/13/2023 10:44 PM    SPECGRAV <=1.005 03/13/2023 10:44 PM    GLUCOSEU Negative 03/13/2023 10:44 PM       Radiology:  No orders to display       IP CONSULT TO NEPHROLOGY  IP CONSULT TO NEPHROLOGY  IP CONSULT TO CARDIOLOGY    Assessment/Plan:      -Hyponatremia. .. 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 Improving. Nephrology following. Monitor. -UTI, concern for prostatitis--symptoms resolved within 24 hours of presentation--presented with had pelvic pain reminiscent of his prior prostatitis--currently on ciprofloxacin. No UCx was obtained. - Afib RVR: New onset. Cardiology consulted. Back to NSR. Eliquis and Metoprolol.     -History of prostate cancer ,BPH s/p TURP. Arlyn Florence   Patient occasionally requires self-catheterization ... Continue Flomax. .  Follow-up with urology    -History of thyroid cancer status postresection, history of esophageal cancer s/p chemoradiation. .  In remission--followed by oncology    -Hypothyroidism-postsurgical-TSH is suppressed--decrease dose of Synthroid and repeat TSH in 6 weeks    -Essential hypertension, uncontrolled on presentation--improved--on metoprolol plus nifedipine added this admission    -Hyperlipidemia-continue statin      DVT Prophylaxis:   Diet: ADULT DIET; Regular; 5 carb choices (75 gm/meal); 1500 ml  Code Status: Full Code  PT/OT Eval Status:     Dispo- Home 1-2 days.        Charmayne Lavender, MD

## 2023-03-17 ENCOUNTER — TELEPHONE (OUTPATIENT)
Dept: CARDIOLOGY | Age: 78
End: 2023-03-17

## 2023-03-17 VITALS
WEIGHT: 171.5 LBS | HEART RATE: 85 BPM | SYSTOLIC BLOOD PRESSURE: 124 MMHG | HEIGHT: 69 IN | DIASTOLIC BLOOD PRESSURE: 72 MMHG | BODY MASS INDEX: 25.4 KG/M2 | RESPIRATION RATE: 18 BRPM | TEMPERATURE: 97.4 F | OXYGEN SATURATION: 92 %

## 2023-03-17 PROBLEM — D68.69 SECONDARY HYPERCOAGULABLE STATE (HCC): Status: ACTIVE | Noted: 2023-03-17

## 2023-03-17 PROBLEM — I48.0 PAF (PAROXYSMAL ATRIAL FIBRILLATION) (HCC): Status: ACTIVE | Noted: 2023-03-17

## 2023-03-17 LAB
ALBUMIN SERPL-MCNC: 3 G/DL (ref 3.4–5)
ANION GAP SERPL CALCULATED.3IONS-SCNC: 12 MMOL/L (ref 3–16)
BUN SERPL-MCNC: 7 MG/DL (ref 7–20)
CALCIUM SERPL-MCNC: 8.6 MG/DL (ref 8.3–10.6)
CHLORIDE SERPL-SCNC: 92 MMOL/L (ref 99–110)
CO2 SERPL-SCNC: 23 MMOL/L (ref 21–32)
CREAT SERPL-MCNC: 0.6 MG/DL (ref 0.8–1.3)
GFR SERPLBLD CREATININE-BSD FMLA CKD-EPI: >60 ML/MIN/{1.73_M2}
GLUCOSE BLD-MCNC: 139 MG/DL (ref 70–99)
GLUCOSE BLD-MCNC: 92 MG/DL (ref 70–99)
GLUCOSE SERPL-MCNC: 167 MG/DL (ref 70–99)
MAGNESIUM SERPL-MCNC: 1.5 MG/DL (ref 1.8–2.4)
NT-PROBNP SERPL-MCNC: 455 PG/ML (ref 0–449)
PERFORMED ON: ABNORMAL
PERFORMED ON: NORMAL
PHOSPHATE SERPL-MCNC: 3.4 MG/DL (ref 2.5–4.9)
POTASSIUM SERPL-SCNC: 3.3 MMOL/L (ref 3.5–5.1)
SODIUM SERPL-SCNC: 127 MMOL/L (ref 136–145)

## 2023-03-17 PROCEDURE — 6360000002 HC RX W HCPCS: Performed by: INTERNAL MEDICINE

## 2023-03-17 PROCEDURE — 6370000000 HC RX 637 (ALT 250 FOR IP): Performed by: INTERNAL MEDICINE

## 2023-03-17 PROCEDURE — 6360000002 HC RX W HCPCS: Performed by: STUDENT IN AN ORGANIZED HEALTH CARE EDUCATION/TRAINING PROGRAM

## 2023-03-17 PROCEDURE — 80069 RENAL FUNCTION PANEL: CPT

## 2023-03-17 PROCEDURE — 2580000003 HC RX 258: Performed by: INTERNAL MEDICINE

## 2023-03-17 PROCEDURE — 83735 ASSAY OF MAGNESIUM: CPT

## 2023-03-17 PROCEDURE — 83880 ASSAY OF NATRIURETIC PEPTIDE: CPT

## 2023-03-17 PROCEDURE — 99232 SBSQ HOSP IP/OBS MODERATE 35: CPT

## 2023-03-17 PROCEDURE — 36415 COLL VENOUS BLD VENIPUNCTURE: CPT

## 2023-03-17 PROCEDURE — 6370000000 HC RX 637 (ALT 250 FOR IP): Performed by: HOSPITALIST

## 2023-03-17 PROCEDURE — 6370000000 HC RX 637 (ALT 250 FOR IP): Performed by: STUDENT IN AN ORGANIZED HEALTH CARE EDUCATION/TRAINING PROGRAM

## 2023-03-17 RX ORDER — CIPROFLOXACIN 500 MG/1
500 TABLET, FILM COATED ORAL 2 TIMES DAILY
Qty: 12 TABLET | Refills: 0 | Status: SHIPPED | OUTPATIENT
Start: 2023-03-17 | End: 2023-03-23

## 2023-03-17 RX ORDER — FUROSEMIDE 20 MG/1
20 TABLET ORAL DAILY
Qty: 60 TABLET | Refills: 3 | Status: SHIPPED | OUTPATIENT
Start: 2023-03-18

## 2023-03-17 RX ORDER — MAGNESIUM CHLORIDE 64 MG
1 TABLET, DELAYED RELEASE (ENTERIC COATED) ORAL
Qty: 30 TABLET | Refills: 3 | Status: SHIPPED | OUTPATIENT
Start: 2023-03-17

## 2023-03-17 RX ORDER — LACTOBACILLUS RHAMNOSUS GG 10B CELL
1 CAPSULE ORAL 2 TIMES DAILY WITH MEALS
Qty: 12 CAPSULE | Refills: 0 | Status: SHIPPED | OUTPATIENT
Start: 2023-03-17 | End: 2023-03-17 | Stop reason: SDUPTHER

## 2023-03-17 RX ORDER — LEVOTHYROXINE SODIUM 0.07 MG/1
112.5 TABLET ORAL DAILY
Qty: 30 TABLET | Refills: 3 | Status: SHIPPED | OUTPATIENT
Start: 2023-03-18 | End: 2023-03-17 | Stop reason: SDUPTHER

## 2023-03-17 RX ORDER — MAGNESIUM CHLORIDE 64 MG
1 TABLET, DELAYED RELEASE (ENTERIC COATED) ORAL
Qty: 30 TABLET | Refills: 3 | Status: SHIPPED | OUTPATIENT
Start: 2023-03-17 | End: 2023-03-17 | Stop reason: SDUPTHER

## 2023-03-17 RX ORDER — LEVOTHYROXINE SODIUM 0.07 MG/1
112.5 TABLET ORAL DAILY
Qty: 30 TABLET | Refills: 3 | Status: SHIPPED | OUTPATIENT
Start: 2023-03-18

## 2023-03-17 RX ORDER — LACTOBACILLUS RHAMNOSUS GG 10B CELL
1 CAPSULE ORAL 2 TIMES DAILY WITH MEALS
Qty: 12 CAPSULE | Refills: 0 | Status: SHIPPED | OUTPATIENT
Start: 2023-03-17 | End: 2023-03-23

## 2023-03-17 RX ORDER — FUROSEMIDE 20 MG/1
20 TABLET ORAL DAILY
Qty: 60 TABLET | Refills: 3 | Status: SHIPPED | OUTPATIENT
Start: 2023-03-18 | End: 2023-03-17 | Stop reason: SDUPTHER

## 2023-03-17 RX ORDER — CIPROFLOXACIN 500 MG/1
500 TABLET, FILM COATED ORAL 2 TIMES DAILY
Qty: 12 TABLET | Refills: 0 | Status: SHIPPED | OUTPATIENT
Start: 2023-03-17 | End: 2023-03-17 | Stop reason: SDUPTHER

## 2023-03-17 RX ORDER — LACTOBACILLUS RHAMNOSUS GG 10B CELL
1 CAPSULE ORAL 2 TIMES DAILY WITH MEALS
Status: DISCONTINUED | OUTPATIENT
Start: 2023-03-17 | End: 2023-03-17 | Stop reason: HOSPADM

## 2023-03-17 RX ORDER — MAGNESIUM SULFATE IN WATER 40 MG/ML
2000 INJECTION, SOLUTION INTRAVENOUS ONCE
Status: COMPLETED | OUTPATIENT
Start: 2023-03-17 | End: 2023-03-17

## 2023-03-17 RX ADMIN — CIPROFLOXACIN 400 MG: 2 INJECTION, SOLUTION INTRAVENOUS at 12:51

## 2023-03-17 RX ADMIN — MAGNESIUM SULFATE HEPTAHYDRATE 2000 MG: 40 INJECTION, SOLUTION INTRAVENOUS at 14:41

## 2023-03-17 RX ADMIN — APIXABAN 5 MG: 5 TABLET, FILM COATED ORAL at 08:25

## 2023-03-17 RX ADMIN — Medication 30 G: at 14:35

## 2023-03-17 RX ADMIN — METOPROLOL TARTRATE 50 MG: 50 TABLET, FILM COATED ORAL at 08:25

## 2023-03-17 RX ADMIN — FUROSEMIDE 20 MG: 20 TABLET ORAL at 08:25

## 2023-03-17 RX ADMIN — LEVOTHYROXINE SODIUM 112.5 MCG: 0.1 TABLET ORAL at 05:44

## 2023-03-17 RX ADMIN — TAMSULOSIN HYDROCHLORIDE 0.4 MG: 0.4 CAPSULE ORAL at 08:25

## 2023-03-17 RX ADMIN — SODIUM CHLORIDE, PRESERVATIVE FREE 10 ML: 5 INJECTION INTRAVENOUS at 08:26

## 2023-03-17 RX ADMIN — POTASSIUM BICARBONATE 40 MEQ: 782 TABLET, EFFERVESCENT ORAL at 12:38

## 2023-03-17 RX ADMIN — CIPROFLOXACIN 400 MG: 2 INJECTION, SOLUTION INTRAVENOUS at 00:36

## 2023-03-17 ASSESSMENT — PAIN SCALES - GENERAL: PAINLEVEL_OUTOF10: 0

## 2023-03-17 NOTE — CONSULTS
Interval History and plan:      Patient seen at bedside  Asymptomatic   Na 129 > 127  K 3.3    Plan:  Continue Lasix  Check Magnesium level and replete if needed  Replete potassium and monitor. Urea oral 30 today and might need 15 gram daily upon discharge. Fluid restriction and increase protein in diet. Monitor Na level  Age/risk appropriate malignancy screening with oncologist.                           Assessment :     Hyponatremia  Sodium 124 on presentation  No history of alcoholism  Previous smoker  Has history of esophageal and thyroid cancer followed by oncology on surveillance  No active cancer at this time  Urine sodium was 123 on presentation  But sodium did not get better with IV fluids and was given salt tablet and Lasix    Echo: 2/20-normal EF, grade 1 diastolic dysfunction    Hypertension   BP: (124-131)/(72-75)  Heart Rate:  [65-85]   BP goal inpatient [phone] systolic inpatient       Prostatitis  On Antibiotics. [facility] would like to thank Karan Garza MD   for opportunity to serve this patient      Please call with questions at-   24 Hrs Answering service [phone] or  7 am- 5 pm via Perfect serve or cell phone  Miguel Olguin MD          CC/reason for consult :     Hyponatremia     HPI :     Heydi Holcomb is a 66 y.o. male presented to   the hospital on 3/13/2023 with 2 to 3 days of prostate pain, possibly due to the prostatitis. He is known to have recurrent prostatitis. Also known to have hyponatremia and not seen by nephrology. In the ED his sodium was found to be 124 but asymptomatic. He was given 100 500 mL of normal saline bolus  We are consulted for hyponatremia and related issues    Has no nausea vomiting since. Does not take hydrochlorothiazide or chlorthalidone  No fluid overload history  Denies liver problem  Denies alcoholism  He is a previous smoker but is stopped.   He is known to have esophageal cancer and thyroid cancer remission      ROS:         positives in bold   Constitutional:  fever, chills, weakness, weight change, fatigue  Skin:  rash, pruritus, hair loss, bruising, dry skin, petechiae  Head, Face, Neck   headaches, swelling,  cervical adenopathy  Respiratory: shortness of breath, cough, or wheezing  Cardiovascular: chest pain, palpitations, dizzy, edema  Gastrointestinal: nausea, vomiting, diarrhea, constipation,belly pain    Yellow skin, blood in stool  Musculoskeletal:  back pain, muscle weakness, gait problems,       joint pain or swelling. Genitourinary:  dysuria, poor urine flow, flank pain, blood in urine  Neurologic:  vertigo, TIA'S, syncope, seizures, focal weakness  Psychosocial:  insomnia, anxiety, or depression. Additional positive findings:                    All other remaining systems are negative or unable to obtain        PMH/PSH/SH/Family History:     Past Medical History:   Diagnosis Date    Aneurysm ([facility] [address]     ABD    Colon polyp     Diabetes mellitus ([facility] [address]     Enlarged prostate     Hyperlipidemia     Hypertension     Reflux     Wears glasses        Past Surgical History:   Procedure Laterality Date    CATARACT REMOVAL WITH IMPLANT  8/12    left    COLONOSCOPY      POLYPS    COLONOSCOPY  3/07/2012    COLONOSCOPY  8/18/14    polyps    CYST REMOVAL      EYE SURGERY  7/17/2012    rt. cataract removal w/ IOL    UPPER GASTROINTESTINAL ENDOSCOPY N/A 10/9/2019    UPPER EUS W/ANES. (11:00) performed by Micah Guan MD at [facility]  10/9/2019    EGD DILATION BALLOON performed by Micah Guan MD at [id number] [place] [address] Real        reports that he has quit smoking. His smoking use included cigarettes. He has a 51.00 pack-year smoking history. He has never used smokeless tobacco. He reports that he does not currently use alcohol. He reports that he does not use drugs. family history includes Cancer in his father.          Medication: Current Facility-Administered Medications: furosemide (LASIX) tablet 20 mg, 20 mg, Oral, Daily  levothyroxine (SYNTHROID) tablet 112.5 mcg, 112.5 mcg, Oral, Daily  apixaban (ELIQUIS) tablet 5 mg, 5 mg, Oral, BID  ciprofloxacin (CIPRO) IVPB 400 mg, 400 mg, IntraVENous, Q12H  tamsulosin (FLOMAX) capsule 0.4 mg, 0.4 mg, Oral, Daily  atorvastatin (LIPITOR) tablet 20 mg, 20 mg, Oral, Nightly  metoprolol tartrate (LOPRESSOR) tablet 50 mg, 50 mg, Oral, BID  glucose chewable tablet 16 g, 4 tablet, Oral, PRN  dextrose bolus 10% 125 mL, 125 mL, IntraVENous, PRN **OR** dextrose bolus 10% 250 mL, 250 mL, IntraVENous, PRN  glucagon (rDNA) injection 1 mg, 1 mg, SubCUTAneous, PRN  dextrose 10 % infusion, , IntraVENous, Continuous PRN  sodium chloride flush 0.9 % injection 10 mL, 10 mL, IntraVENous, 2 times per day  sodium chloride flush 0.9 % injection 10 mL, 10 mL, IntraVENous, PRN  0.9 % sodium chloride infusion, , IntraVENous, PRN  ondansetron (ZOFRAN) injection 4 mg, 4 mg, IntraVENous, Q4H PRN  polyethylene glycol (GLYCOLAX) packet 17 g, 17 g, Oral, Daily PRN  acetaminophen (TYLENOL) tablet 650 mg, 650 mg, Oral, Q4H PRN **OR** acetaminophen (TYLENOL) suppository 650 mg, 650 mg, Rectal, Q4H PRN  insulin lispro (HUMALOG) injection vial 0-8 Units, 0-8 Units, SubCUTAneous, TID WC  insulin lispro (HUMALOG) injection vial 0-4 Units, 0-4 Units, SubCUTAneous, Nightly  oxyCODONE (ROXICODONE) immediate release tablet 5 mg, 5 mg, Oral, Q4H PRN  benzonatate (TESSALON) capsule 200 mg, 200 mg, Oral, TID PRN       Vitals :     Vitals:    03/17/23 0900   BP: 124/72   Pulse: 85   Resp: 18   Temp: 97.4 °F (36.3 °C)   SpO2: 92%       I & O :       Intake/Output Summary (Last 24 hours) at 3/17/2023 0927  Last data filed at 3/17/2023 0858  Gross per 24 hour   Intake 840 ml   Output 2700 ml   Net -1860 ml          Physical Examination :     General appearance: Alert and oriented  Respiratory: Lungs clear.    Cardiovascular: S1, S 2 Edema Abdomen: -Soft, non distended.    Other relevant findings:-       LABS:     Recent Labs     03/15/23  0501 03/16/23  0556   WBC 10.3 6.9   HGB 13.7 13.6   HCT 40.5 40.5    167       Recent Labs     03/15/23  0501 03/16/23  0556   * 129*   K 3.9 3.6   CL 92* 95*   CO2 22 23   BUN 7 8   CREATININE 0.6* 0.6*   GLUCOSE 109* 104*              Thanks,   [facility]  [address]  Office: [phone]  Fax: [phone]

## 2023-03-17 NOTE — PROGRESS NOTES
[facility]     Electrophysiology                                     Progress Note    Admission date:  3/13/2023    Reason for follow up visit: PAF    HPI/CC: Smiley Nash was admitted on 3/13/2023 with prostate pain and concern for prostatitis. On 3/15/2023 he had an episode of new onset AF and EP was consulted. TSH was found to be <0.01, T4 was 2.1. He has also been treated for hyponatremia. On 3/15/2023 echo showed EF of 50-55%, grade 1 DD with normal filling pressure, mild MR/AS. Rhythm has been SR with PAC's, HR in the 60's. Brief atrial runs. Subjective: He reports constipation and acid reflux. He denies dizziness, chest pain, palpitations and shortness of breath. Vitals:  Blood pressure 124/72, pulse 85, temperature 97.4 °F (36.3 °C), temperature source Oral, resp. rate 18, height 5' 9\" (1.753 m), weight 171 lb 8 oz (77.8 kg), SpO2 92 %.   Temp  Av.8 °F (36.6 °C)  Min: 97.4 °F (36.3 °C)  Max: 98.1 °F (36.7 °C)  Pulse  Av.6  Min: 64  Max: 85  BP  Min: 106/63  Max: 131/74  SpO2  Av %  Min: 92 %  Max: 98 %    24 hour I/O    Intake/Output Summary (Last 24 hours) at 3/17/2023 1043  Last data filed at 3/17/2023 0858  Gross per 24 hour   Intake 480 ml   Output 2000 ml   Net -1520 ml     Current Facility-Administered Medications   Medication Dose Route Frequency Provider Last Rate Last Admin    furosemide (LASIX) tablet 20 mg  20 mg Oral Daily Bernie De Oliveira MD   20 mg at 23 0825    levothyroxine (SYNTHROID) tablet 112.5 mcg  112.5 mcg Oral Daily Arun Dow MD   112.5 mcg at 23 0544    apixaban (ELIQUIS) tablet 5 mg  5 mg Oral BID Arun Dow MD   5 mg at 23 0825    ciprofloxacin (CIPRO) IVPB 400 mg  400 mg IntraVENous Q12H Dixon Hall MD   Stopped at 23 0200    tamsulosin (FLOMAX) capsule 0.4 mg  0.4 mg Oral Daily Dixon Hall MD   0.4 mg at 23 0825    atorvastatin (LIPITOR) tablet 20 mg  20 mg Oral Nightly Richar Solis Vanessa Noguera MD   20 mg at 03/16/23 2101    metoprolol tartrate (LOPRESSOR) tablet 50 mg  50 mg Oral BID Anel Bauman MD   50 mg at 03/17/23 0825    glucose chewable tablet 16 g  4 tablet Oral PRN Anel Bauman MD        dextrose bolus 10% 125 mL  125 mL IntraVENous PRN Anel Bauman MD        Or    dextrose bolus 10% 250 mL  250 mL IntraVENous PRN Anel Bauman MD        glucagon (rDNA) injection 1 mg  1 mg SubCUTAneous PRN Anel Bauman MD        dextrose 10 % infusion   IntraVENous Continuous PRN Anel Bauman MD        sodium chloride flush 0.9 % injection 10 mL  10 mL IntraVENous 2 times per day nAel Bauman MD   10 mL at 03/17/23 0826    sodium chloride flush 0.9 % injection 10 mL  10 mL IntraVENous PRN Anel Bauman MD   10 mL at 03/16/23 2101    0.9 % sodium chloride infusion   IntraVENous PRN Anel Bauman MD        [facility]ron [facility] injection 4 mg  4 mg IntraVENous Q4H PRN Anel Bauman MD        polyethylene glycol (GLYCOLAX) packet 17 g  17 g Oral Daily PRN Anel Bauman MD   17 g at 03/15/23 2353    acetaminophen (TYLENOL) tablet 650 mg  650 mg Oral Q4H PRN Anel Bauman MD        Or    acetaminophen (TYLENOL) suppository 650 mg  650 mg Rectal Q4H PRN Anel Bauman MD        insulin lispro (HUMALOG) injection vial 0-8 Units  0-8 Units SubCUTAneous TID WC Anel Bauman MD        insulin lispro (HUMALOG) injection vial 0-4 Units  0-4 Units SubCUTAneous Nightly Anel Bauman MD        oxyCODONE (ROXICODONE) immediate release tablet 5 mg  5 mg Oral Q4H PRN Anel Bauman MD   5 mg at 03/16/23 2335    benzonatate (TESSALON) capsule 200 mg  200 mg Oral TID PRN Adam Medal, APRN - CNP   200 mg at 03/14/23 2312       Objective:     Telemetry monitor: SR with PAC's, brief atrial runs     Physical Exam:  Constitutional and general appearance: alert, cooperative, no distress, and appears stated age  [de-identified]: Normal oral mucosa  Respiratory:  Normal excursion and expansion without use of accessory muscles  Resp auscultation: +rhonchi  Cardiovascular:  Heart tones are crisp and normal. regular S1 and S2.  Jugular venous pulsation Normal  Peripheral pulses are symmetrical and full   Abdomen:  No masses or tenderness  Bowel sounds present  Extremities:   No cyanosis or clubbing   No lower extremity edema   Skin: warm and dry  Neurological:  Alert and oriented  Moves all extremities well  No abnormalities of mood, affect, memory, mentation, or behavior are noted    Data    Echo 3/15/2023:    Conclusions      Summary   The left ventricular systolic function is mildly reduced with an ejection   fraction of 50-55%. The study is limited and regional wall motion abnormalities cannot be   evaluated or excluded. Grade I diastolic dysfunction with normal filling pressure. Mild mitral regurgitation. Mild aortic stenosis. Systolic pulmonic artery pressure (SPAP) is normal estimated at 39 mmHg   (Right atrial pressure of 8 mmHg). Stress test 6/2021:  RESTING ECG:  Sinus rhythm   STRESS ECG:  No ischemia   PERFUSION:  Normal   GATING:  Normal     The Gated wall motion calculated ejection fraction was 56%. Total Frames per scan 32. Good quality study   Low risk study   No attenuation artifact     Echo 2/2020:  Study Conclusions     - Left ventricle: The cavity size is normal. Wall thickness is normal. Systolic function was normal.     The estimated ejection fraction was in the range of 55% to 60%. Wall motion was normal; there were     no regional wall motion abnormalities. Doppler parameters are consistent with abnormal left     ventricular relaxation (grade 1 diastolic dysfunction). - Aortic valve: Trileaflet; moderately thickened, moderately calcified leaflets. Transvalvular     velocity is minimally increased. There is no stenosis. - Right ventricle: Systolic function was normal by objective interpretation. TAPSE: 2.6cm. All labs and testing reviewed.   Lab Review Renal Profile:   Lab Results   Component Value Date/Time    CREATININE 0.6 03/17/2023 09:46 AM    BUN 7 03/17/2023 09:46 AM     03/17/2023 09:46 AM    K 3.3 03/17/2023 09:46 AM    K 3.6 03/16/2023 05:56 AM    CL 92 03/17/2023 09:46 AM    CO2 23 03/17/2023 09:46 AM     CBC:    Lab Results   Component Value Date/Time    WBC 6.9 03/16/2023 05:56 AM    RBC 4.64 03/16/2023 05:56 AM    HGB 13.6 03/16/2023 05:56 AM    HCT 40.5 03/16/2023 05:56 AM    MCV 87.5 03/16/2023 05:56 AM    RDW 13.9 03/16/2023 05:56 AM     03/16/2023 05:56 AM     BNP:  No results found for: BNP  Fasting Lipid Panel:  No results found for: CHOL, HDL, TRIG  Cardiac Enzymes:  CK/MbTroponin  Lab Results   Component Value Date/Time    TROPONINI <0.01 10/26/2018 12:17 PM     PT/ INR No results found for: INR, PROTIME  PTT No results found for: PTT No results found for: MG   Lab Results   Component Value Date/Time    TSH <0.01 03/13/2023 10:44 PM     Assessment:  Paroxysmal atrial fibrillation: ongoing   -[id number] score: 4 (age, DM, HTN)    -TSH < 0.01, T4 2.1  PAC's: asymptomatic    -noted on telemetry  HTN: controlled   DM  HLD  Hypothyroidism s/p thyroid cancer and thyroidectomy: found to be hyperthyroid this admission   Former tobacco abuse   History of prostate cancer, BPH s/p TURP  UTI with concern of prostatitis   Hyponatremia: nephrology following       Plan:   1. Continue Lopressor and Eliquis  2. Continue telemetry monitoring while admitted   3. 2 week cardiac monitor ordered at discharge   4. EP follow up to be arranged in 2-3 months  5.  EP will sign off but remains available if needed      JORGE Louise-CNP  [facility]  [phone]

## 2023-03-17 NOTE — PROGRESS NOTES
Physician Progress Note      PATIENT:               Tay Granger  CSN #:                  [id number]  :                       1945  ADMIT DATE:       3/13/2023 9:56 PM  [address] [place] DATE:  Charbel Ibrahim  PROVIDER #:        Shilpi Knapp MD          QUERY TEXT:    Patient admitted with  \"Hyponatremia. .. 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 Improving/UTI. \"   PN-Afib RVR: New onset. Atrial fibrillation and is   maintained on apixaban. If possible, please document in progress notes and   discharge summary if you are evaluating and/or treating any of the following: The medical record reflects the following:  Risk Factors: afib  Clinical Indicators: EP-Patient has a [id number] score of 4 [Age over 75 (2   points), Diabetes Mellitus (1 point), and Hypertension (1 point)] Longterm   anticoagulation is: recommended  Treatment: tele, EP consult-  had a detailed discussion with the patient about   issues related to atrial fibrillation (including etiology, disease   progression patterns, stroke risk and rate control issues). Patient had his   questions answered to his satisfaction. Current anticoagulation: Apixaban    Thank-You, Claribel Bobo RN, BSN, CCDS  Options provided:  -- Secondary hypercoagulable state in a patient with atrial fibrillation  -- Other - I will add my own diagnosis  -- Disagree - Not applicable / Not valid  -- Disagree - Clinically unable to determine / Unknown  -- Refer to Clinical Documentation Reviewer    PROVIDER RESPONSE TEXT:    This patient has secondary hypercoagulable state in a patient with atrial   fibrillation.     Query created by: Isaiah Turcios on 3/17/2023 2:01 PM      Electronically signed by:  Shilpi Knapp MD 3/17/2023 2:06 PM

## 2023-03-17 NOTE — PLAN OF CARE
Problem: Safety - Adult  Goal: Free from fall injury  3/17/2023 1556 by William Parker RN  Flowsheets (Taken 3/17/2023 1556)  Free From Fall Injury:   Gorge Car family/caregiver on patient safety   Based on caregiver fall risk screen, instruct family/caregiver to ask for assistance with transferring infant if caregiver noted to have fall risk factors  3/17/2023 0403 by Jerman Rebolledo RN  Outcome: Progressing     Problem: Chronic Conditions and Co-morbidities  Goal: Patient's chronic conditions and co-morbidity symptoms are monitored and maintained or improved  3/17/2023 1556 by William Parker RN  Flowsheets (Taken 3/17/2023 1556)  Care Plan - Patient's Chronic Conditions and Co-Morbidity Symptoms are Monitored and Maintained or Improved:   Monitor and assess patient's chronic conditions and comorbid symptoms for stability, deterioration, or improvement   Collaborate with multidisciplinary team to address chronic and comorbid conditions and prevent exacerbation or deterioration  3/17/2023 0403 by Jerman Rebolledo RN  Outcome: Progressing

## 2023-03-17 NOTE — DISCHARGE SUMMARY
Hospitalist Discharge Summary    Patient ID:  Luciana Temple  [id number]  68 y.o.  1945    Admit date: 3/13/2023    Discharge date: 3/17/2023    Disposition: home    Admission Diagnoses:   Patient Active Problem List   Diagnosis    Cellulitis    Essential hypertension    Type 2 diabetes mellitus ([facility]    HLD (hyperlipidemia)    BPH (benign prostatic hyperplasia)    Hyponatremia    Smoker    Lymphedema    Acute cystitis without hematuria    Acquired hypothyroidism    PAF (paroxysmal atrial fibrillation) (HCC)    Secondary hypercoagulable state ([facility]       Discharge Diagnoses: Principal Problem:    Hyponatremia  Active Problems:    Acute cystitis without hematuria    Acquired hypothyroidism    PAF (paroxysmal atrial fibrillation) (HCC)    Secondary hypercoagulable state ([facility]    Essential hypertension    Type 2 diabetes mellitus (HCC)    HLD (hyperlipidemia)  Resolved Problems:    * No resolved hospital problems. *      Code Status:  Full Code    Condition:  Stable    Discharge Diet: Diet:  ADULT DIET; Regular; 5 carb choices (75 gm/meal); 1200 ml    PCP to do list:  should decrease pt levothyroxine from 137mcg mcg daily to 112.5 mcg daily. Should have follow up with nephrology and PCP   PCP in one week  Nephrology in 2 weeks    Pt has cardiology appt on 5/23 at [address]: 66y.o. year-old male with a history of hypertension, hyperlipidemia, type II diabetes mellitus, acquired hypothyroidism and a previous h/o prostatitis as well as episodes of hypernatremia. He presents to the ER for evaluation following a 2-3 day h/o worsening prostate pain and concern for return of possible concern for prostatitis. On evaluation in the ER he was found to have hyponatremia. Nephrology was consulted by the ER provider and recommended a 500 ml bolus of NS. His sodium was initially 124. 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 It did improve with salt tablets and fluid restriction. Pt then went into new onset afib with RVR with rate in 120-140s - [id number] score 4. Pt was started on cardizem bolus /drip, eliquis. Pt does have secondary hypercoagulable state due to afib. Ultimately started on metoprolol and treatment of underlying thyroid condition. Pt stable for discharge home with lasix daily, urea 15mg daily,   Pt has hx of thyroid cancer s/p resection and now with hyperthyroidism - so levothyroxine dose should be decreased - will recommend 112.5 mcg daily to PCP. Discharge Medications:   Current Discharge Medication List        START taking these medications    Details   apixaban (ELIQUIS) 5 MG TABS tablet Take 1 tablet by mouth 2 times daily  Qty: 60 tablet, Refills: 5      urea (URE-NA) 15 g PACK packet Take 15 g by mouth daily Mix each 15 g dose with 3 to 4 ounces of water or juice.   Qty: 30 each, Refills: 3      furosemide (LASIX) 20 MG tablet Take 1 tablet by mouth daily  Qty: 60 tablet, Refills: 3      magnesium chloride (MAG DELAY) 64 MG TBEC extended release tablet Take 1 tablet by mouth daily (with breakfast)  Qty: 30 tablet, Refills: 3      lactobacillus (CULTURELLE) capsule Take 1 capsule by mouth 2 times daily (with meals) for 6 days  Qty: 12 capsule, Refills: 0      ciprofloxacin (CIPRO) 500 MG tablet Take 1 tablet by mouth 2 times daily for 6 days  Qty: 12 tablet, Refills: 0           Current Discharge Medication List        CONTINUE these medications which have CHANGED    Details   levothyroxine (SYNTHROID) 75 MCG tablet Take 1.5 tablets by mouth Daily  Qty: 30 tablet, Refills: 3           Current Discharge Medication List        CONTINUE these medications which have NOT CHANGED    Details   tamsulosin (FLOMAX) 0.4 MG capsule Take 0.4 mg by mouth daily      omeprazole (PRILOSEC) 20 MG delayed release capsule       simvastatin (ZOCOR) 40 MG tablet Take by mouth      polyethylene glycol (MIRALAX) 17 g PACK packet Take 17 g by mouth daily      Metoprolol Tartrate (LOPRESSOR PO) Take 50 mg by mouth 2 times daily            Current Discharge Medication List        STOP taking these medications       Multiple Vitamins-Minerals (THERAPEUTIC MULTIVITAMIN-MINERALS) tablet Comments:   Reason for Stopping:         doxycycline hyclate (VIBRAMYCIN) 100 MG capsule Comments:   Reason for Stopping:         aspirin 81 MG EC tablet Comments:   Reason for Stopping:         LISINOPRIL PO Comments:   Reason for Stopping:         METFORMIN HCL PO Comments:   Reason for Stopping:                   Procedures: none    Assessment on Discharge: Stable, improved     Discharge ROS:  A complete review of systems was asked and negative except for constipation, but wants to manage at home    Discharge Exam:  /72   Pulse 85   Temp 97.4 °F (36.3 °C) (Oral)   Resp 18   Ht 5' 9\" (1.753 m)   Wt 171 lb 8 oz (77.8 kg)   SpO2 92%   BMI 25.33 kg/m²     Gen: NAD  HEENT: NC/AT, moist mucous membranes, no oropharyngeal erythema or exudate  Neck: supple, trachea midline, no anterior cervical or SC LAD  Heart:  Normal s1/s2, IRR, no murmurs, gallops, or rubs. no leg edema  Lungs:  clear bilaterally, no wheeze,no rales or rhonchi, no use of accessory muscles  Abd: bowel sounds present, soft, nontender, nondistended, no masses  Extrem:  No clubbing, cyanosis,  no edema  Skin: no lesion or masses  Psych:  A & O x3  Neuro: grossly intact, moves all four extremities    Pertinent Studies During Hospital Stay:  Radiology:  No results found.          Last Labs on Discharge:     Recent Results (from the past 24 hour(s))   POCT Glucose    Collection Time: 03/16/23  4:44 PM   Result Value Ref Range    POC Glucose 148 (H) 70 - 99 mg/dl    Performed on ACCU-CHEK    POCT Glucose    Collection Time: 03/16/23  7:47 PM   Result Value Ref Range    POC Glucose 104 (H) 70 - 99 mg/dl    Performed on ACCU-CHEK    POCT Glucose    Collection Time: 03/17/23  7:44 AM   Result Value Ref Range    POC Glucose 92 70 - 99 mg/dl Performed on ACCU-CHEK    Renal Function Panel    Collection Time: 03/17/23  9:46 AM   Result Value Ref Range    Sodium 127 (L) 136 - 145 mmol/L    Potassium 3.3 (L) 3.5 - 5.1 mmol/L    Chloride 92 (L) 99 - 110 mmol/L    CO2 23 21 - 32 mmol/L    Anion Gap 12 3 - 16    Glucose 167 (H) 70 - 99 mg/dL    BUN 7 7 - 20 mg/dL    Creatinine 0.6 (L) 0.8 - 1.3 mg/dL    Est, Glom Filt Rate >60 >60    Calcium 8.6 8.3 - 10.6 mg/dL    Phosphorus 3.4 2.5 - 4.9 mg/dL    Albumin 3.0 (L) 3.4 - 5.0 g/dL   Brain Natriuretic Peptide    Collection Time: 03/17/23  9:46 AM   Result Value Ref Range    Pro- (H) 0 - 449 pg/mL   Magnesium    Collection Time: 03/17/23  9:46 AM   Result Value Ref Range    Magnesium 1.50 (L) 1.80 - 2.40 mg/dL   POCT Glucose    Collection Time: 03/17/23 11:58 AM   Result Value Ref Range    POC Glucose 139 (H) 70 - 99 mg/dl    Performed on ACCU-CHEK          Follow up: with Landen Martinez MD    Note that 35 minutes was spent in preparing discharge papers, discussing discharge with patient, medication review, etc.    Thank you Landen Martinez MD for the opportunity to be involved in this patient's care. If you have any questions or concerns please feel free to contact me at [phone].       Electronically signed by Alan Olivas MD on 3/17/2023 at 2:13 PM

## 2023-03-17 NOTE — DISCHARGE INSTRUCTIONS
FOLLOW-UP APPOINTMENTS    [facility] OFFICE - Appointment on May 23rd at 10:30am with Ally Travis CNP, [facility]. [facility],  [facility], [address], [address] [facility], Critical access hospital[facility]. Office #: [phone]. If you are unable to make this appointment, please call to reschedule. Directions to [place] [address] towards [place]. [address]. Right off exit. Cross over [employer]. Right on [address]. Left into hospital. Follow the signs to the emergency room ( turn left toward the Emergency room). Go right at the first stop sign. Just past the Emergency room at the second stop sign turn right and go up the ramp and park on the top level if possible. Go in the glass doors of the [facility] we on the top level of the garage [address]. As soon as you get in the door turn left and our office is the one with the glass doors. [employer] MONITOR PATCH PATIENT INSTRUCTIONS    Remove Patch in 1 week from application. Recalibrate and apply next patch. Call  [employer] CUSTOMER SUPPORT: [phone] and they will assist if needed. o PIN: [id number]    o Keep the phone with you as much as possible. Be sure to charge the phone overnight and when the battery gets low. If the phone dies completely, be sure to restart the phone and enter the PIN number to confirm the patch is connected to your phone.    o If you are away from the phone for more than 8 hours, please stay within a 30 foot range of the phone for 3 hours to ensure your data fully uploads. o If you go somewhere with no cellphone service, still keep the phone with you and charged. Your data will upload when you reach cellphone service. You can stay outside of cellphone range for up to 10 days. o This phone cannot make calls, take pictures, etc. It is solely for medical use and will only Bluetooth connect to your patch. · Patch Instructions:    o Wait to exercise or shower for 30 minutes after application.     o Shower with the water stream to your back and avoid putting the patch directly under the water stream.    o Do not bathe, swim or fully submerge the patch.    o If the patch starts to lift off after showering or sweating, dry the patch with a towel and allow the adhesive to dry. It should re-adhere to your skin. If it continues to lift, use the adhesive overlay to re-adhere the patch. Video instructions for the adhesive overlay are on the phone under the Menu Tab in the right hand corner - [address]    o One patch lasts for up to 7 days. If you need to wear an additional patch or replace a patch, utilize the included application instructions or the video instructions - Menu - Help - [employer] Application    · End of Wear - Returning the Phone    o Tim Turk are responsible for returning the phone. You will be financially responsible for an unreturned phone. o Discard the used patch in the trash    o Return the phone,  and any unopened additional patches or adhesives in the pre-paid mailing pouch or drop off at the office. o Drop the  at [address] [place] [address] or box or schedule a home  by calling    ups - [phone]. Keep the tracking number for your records.     CONTACT "[employer]" CUSTOMER SUPPORT:  [phone]

## 2023-03-17 NOTE — PLAN OF CARE
Problem: Pain  Goal: Verbalizes/displays adequate comfort level or baseline comfort level  Outcome: Progressing     Problem: Safety - Adult  Goal: Free from fall injury  Outcome: Progressing     Problem: ABCDS Injury Assessment  Goal: Absence of physical injury  Outcome: Progressing     Problem: Nutrition Deficit:  Goal: Optimize nutritional status  Outcome: Progressing     Problem: Chronic Conditions and Co-morbidities  Goal: Patient's chronic conditions and co-morbidity symptoms are monitored and maintained or improved  Outcome: Progressing     Problem: Discharge Planning  Goal: Discharge to home or other facility with appropriate resources  Outcome: Progressing

## 2023-03-17 NOTE — PROGRESS NOTES
Patient and family given discharge instructions. All questions and concerns were addressed. Event monitor put on and instructions given to patient and spouse. Patient dressed and wheeled to car with all patient belongings.

## 2023-03-17 NOTE — PROGRESS NOTES
Pt want to go home  D/W primary team.     Mag 1.6  IV Mag  2 g x 1   Oral Mag chloride slow release 1 tablet daily  KCL 20 MEQ Daily   Lasix 20 mg daily  Oral Urea 15 g daily. Will arrange for follow up with Dr Elenita Amaral in 1- 2 weeks. Please have repeat renal panel in one week with PCP.

## 2023-03-17 NOTE — TELEPHONE ENCOUNTER
Monitor company [employer]  Length of monitor 14 days  Monitor ordered by Ivan Moss CNP   Patch ID [id number]  Device number [id number]  Monitor given to Coleen Sherman RN . Nurse to apply at the time of discharge.

## 2023-04-14 PROCEDURE — 93228 REMOTE 30 DAY ECG REV/REPORT: CPT | Performed by: INTERNAL MEDICINE

## 2023-04-24 ENCOUNTER — TELEPHONE (OUTPATIENT)
Dept: CARDIOLOGY CLINIC | Age: 78
End: 2023-04-24

## 2023-05-23 DIAGNOSIS — I48.0 PAF (PAROXYSMAL ATRIAL FIBRILLATION) (HCC): ICD-10-CM

## 2023-08-13 ENCOUNTER — HOSPITAL ENCOUNTER (EMERGENCY)
Age: 78
Discharge: HOME | End: 2023-08-13
Payer: MEDICARE

## 2023-08-13 VITALS
BODY MASS INDEX: 25.1 KG/M2 | SYSTOLIC BLOOD PRESSURE: 136 MMHG | RESPIRATION RATE: 17 BRPM | HEART RATE: 68 BPM | TEMPERATURE: 97.8 F | OXYGEN SATURATION: 96 % | WEIGHT: 170 LBS | DIASTOLIC BLOOD PRESSURE: 70 MMHG

## 2023-08-13 DIAGNOSIS — N41.9 PROSTATE INFECTION: Primary | ICD-10-CM

## 2023-08-13 LAB
BACTERIA URNS QL MICRO: ABNORMAL /HPF
BILIRUB UR QL STRIP.AUTO: NEGATIVE
CLARITY UR: CLEAR
COLOR UR: YELLOW
EPI CELLS #/AREA URNS HPF: ABNORMAL /HPF (ref 0–5)
GLUCOSE UR STRIP.AUTO-MCNC: NEGATIVE MG/DL
HGB UR QL STRIP.AUTO: NEGATIVE
KETONES UR STRIP.AUTO-MCNC: NEGATIVE MG/DL
LEUKOCYTE ESTERASE UR QL STRIP.AUTO: ABNORMAL
NITRITE UR QL STRIP.AUTO: POSITIVE
PH UR STRIP.AUTO: 6.5 [PH] (ref 5–8)
PROT UR STRIP.AUTO-MCNC: NEGATIVE MG/DL
RBC #/AREA URNS HPF: ABNORMAL /HPF (ref 0–4)
RENAL EPI CELLS #/AREA UR COMP ASSIST: ABNORMAL /HPF (ref 0–1)
SP GR UR STRIP.AUTO: <=1.005 (ref 1–1.03)
UA DIPSTICK W REFLEX MICRO PNL UR: YES
URN SPEC COLLECT METH UR: ABNORMAL
UROBILINOGEN UR STRIP-ACNC: 1 E.U./DL
WBC #/AREA URNS HPF: ABNORMAL /HPF (ref 0–5)

## 2023-08-13 PROCEDURE — 6370000000 HC RX 637 (ALT 250 FOR IP): Performed by: NURSE PRACTITIONER

## 2023-08-13 PROCEDURE — 87086 URINE CULTURE/COLONY COUNT: CPT

## 2023-08-13 PROCEDURE — 81001 URINALYSIS AUTO W/SCOPE: CPT

## 2023-08-13 PROCEDURE — 99283 EMERGENCY DEPT VISIT LOW MDM: CPT

## 2023-08-13 RX ORDER — CIPROFLOXACIN 500 MG/1
500 TABLET, FILM COATED ORAL ONCE
Status: COMPLETED | OUTPATIENT
Start: 2023-08-13 | End: 2023-08-13

## 2023-08-13 RX ORDER — CIPROFLOXACIN 500 MG/1
500 TABLET, FILM COATED ORAL 2 TIMES DAILY
Qty: 28 TABLET | Refills: 0 | Status: SHIPPED | OUTPATIENT
Start: 2023-08-13 | End: 2023-08-27

## 2023-08-13 RX ADMIN — CIPROFLOXACIN HYDROCHLORIDE 500 MG: 500 TABLET, FILM COATED ORAL at 18:34

## 2023-08-13 ASSESSMENT — PAIN - FUNCTIONAL ASSESSMENT: PAIN_FUNCTIONAL_ASSESSMENT: 0-10

## 2023-08-13 ASSESSMENT — PAIN SCALES - GENERAL: PAINLEVEL_OUTOF10: 9

## 2023-08-13 NOTE — ED PROVIDER NOTES
[address] [place] [address]  ED  EMERGENCY DEPARTMENT ENCOUNTER        Pt Name: Jimmie Arellano  MRN: [id number]  [address] [facility] 1945  Date of evaluation: 8/13/2023  Provider: JORGE Delacruz - CNP  PCP: Maryuri Sutherland MD  Note Started: 6:22 PM EDT 8/13/23    Evaluated by JENNIFER. I have evaluated this patient. My supervising physician was available for consultation. CHIEF COMPLAINT       Chief Complaint   Patient presents with    Other     Having prostate pain, uses catheters and just feels like  he needs some medications       HISTORY OF PRESENT ILLNESS: 1 or more Elements     History From: Patient  Limitations to history : None    Jimmie Arellano is a 66 y.o. male who presents to ER with concern for prostatitis. He is having pain, through his lower bowel/prostate. He has had prostate infections before and this feels the same. He straight caths intermittently. Pain for about 2-3 days. He is taking pyridium. Denies nausea, vomiting. He is taking tylenol ES right now. He does have spontaneous voiding, is having urgency with this. Denies fevers, chills, sweats. Denies abdominal or flank pain. Pro snot want to have blood work done. Nursing Notes were all reviewed and agreed with or any disagreements were addressed in the HPI. REVIEW OF SYSTEMS :      Review of Systems    Positives and Pertinent negatives as per HPI. SURGICAL HISTORY     Past Surgical History:   Procedure Laterality Date    CATARACT REMOVAL WITH IMPLANT  8/12    left    COLONOSCOPY      POLYPS    COLONOSCOPY  3/07/2012    COLONOSCOPY  8/18/14    polyps    CYST REMOVAL      EYE SURGERY  7/17/2012    rt. cataract removal w/ IOL    UPPER GASTROINTESTINAL ENDOSCOPY N/A 10/9/2019    UPPER EUS W/ANES.  (11:00) performed by Mackenzie Wolf MD at [address] [facility]  10/9/2019    EGD DILATION BALLOON performed by Mackenzie Wolf MD at [facility]

## 2023-08-14 LAB — BACTERIA UR CULT: NORMAL

## 2023-08-22 ENCOUNTER — HOSPITAL ENCOUNTER (EMERGENCY)
Age: 78
Discharge: HOME | End: 2023-08-22
Attending: EMERGENCY MEDICINE
Payer: MEDICARE

## 2023-08-22 ENCOUNTER — APPOINTMENT (OUTPATIENT)
Dept: CT IMAGING | Age: 78
End: 2023-08-22
Payer: MEDICARE

## 2023-08-22 VITALS
OXYGEN SATURATION: 97 % | BODY MASS INDEX: 25.92 KG/M2 | SYSTOLIC BLOOD PRESSURE: 165 MMHG | RESPIRATION RATE: 16 BRPM | WEIGHT: 175 LBS | HEIGHT: 69 IN | TEMPERATURE: 97.7 F | DIASTOLIC BLOOD PRESSURE: 68 MMHG | HEART RATE: 70 BPM

## 2023-08-22 DIAGNOSIS — R10.2 PELVIC PAIN: Primary | ICD-10-CM

## 2023-08-22 DIAGNOSIS — N13.30 HYDRONEPHROSIS, UNSPECIFIED HYDRONEPHROSIS TYPE: ICD-10-CM

## 2023-08-22 LAB
ANION GAP SERPL CALCULATED.3IONS-SCNC: 10 MMOL/L (ref 3–16)
BACTERIA URNS QL MICRO: ABNORMAL /HPF
BASOPHILS # BLD: 0.1 K/UL (ref 0–0.2)
BASOPHILS NFR BLD: 1 %
BILIRUB UR QL STRIP.AUTO: NEGATIVE
BUN SERPL-MCNC: 18 MG/DL (ref 7–20)
CALCIUM SERPL-MCNC: 9.1 MG/DL (ref 8.3–10.6)
CHLORIDE SERPL-SCNC: 94 MMOL/L (ref 99–110)
CLARITY UR: CLEAR
CO2 SERPL-SCNC: 25 MMOL/L (ref 21–32)
COLOR UR: YELLOW
CREAT SERPL-MCNC: 0.7 MG/DL (ref 0.8–1.3)
DEPRECATED RDW RBC AUTO: 14.3 % (ref 12.4–15.4)
EOSINOPHIL # BLD: 0 K/UL (ref 0–0.6)
EOSINOPHIL NFR BLD: 0.7 %
EPI CELLS #/AREA URNS HPF: ABNORMAL /HPF (ref 0–5)
GFR SERPLBLD CREATININE-BSD FMLA CKD-EPI: >60 ML/MIN/{1.73_M2}
GLUCOSE SERPL-MCNC: 116 MG/DL (ref 70–99)
GLUCOSE UR STRIP.AUTO-MCNC: NEGATIVE MG/DL
HCT VFR BLD AUTO: 41.8 % (ref 40.5–52.5)
HGB BLD-MCNC: 14 G/DL (ref 13.5–17.5)
HGB UR QL STRIP.AUTO: NEGATIVE
KETONES UR STRIP.AUTO-MCNC: NEGATIVE MG/DL
LEUKOCYTE ESTERASE UR QL STRIP.AUTO: ABNORMAL
LYMPHOCYTES # BLD: 0.6 K/UL (ref 1–5.1)
LYMPHOCYTES NFR BLD: 10.9 %
MAGNESIUM SERPL-MCNC: 1.8 MG/DL (ref 1.8–2.4)
MCH RBC QN AUTO: 31.7 PG (ref 26–34)
MCHC RBC AUTO-ENTMCNC: 33.5 G/DL (ref 31–36)
MCV RBC AUTO: 94.8 FL (ref 80–100)
MONOCYTES # BLD: 0.5 K/UL (ref 0–1.3)
MONOCYTES NFR BLD: 8.9 %
NEUTROPHILS # BLD: 4.4 K/UL (ref 1.7–7.7)
NEUTROPHILS NFR BLD: 78.5 %
NITRITE UR QL STRIP.AUTO: POSITIVE
PH UR STRIP.AUTO: 7 [PH] (ref 5–8)
PLATELET # BLD AUTO: 176 K/UL (ref 135–450)
PMV BLD AUTO: 7.7 FL (ref 5–10.5)
POTASSIUM SERPL-SCNC: 3.5 MMOL/L (ref 3.5–5.1)
PROT UR STRIP.AUTO-MCNC: NEGATIVE MG/DL
RBC # BLD AUTO: 4.41 M/UL (ref 4.2–5.9)
RBC #/AREA URNS HPF: ABNORMAL /HPF (ref 0–4)
SODIUM SERPL-SCNC: 129 MMOL/L (ref 136–145)
SP GR UR STRIP.AUTO: 1.01 (ref 1–1.03)
UA COMPLETE W REFLEX CULTURE PNL UR: ABNORMAL
UA DIPSTICK W REFLEX MICRO PNL UR: YES
URN SPEC COLLECT METH UR: ABNORMAL
UROBILINOGEN UR STRIP-ACNC: 1 E.U./DL
WBC # BLD AUTO: 5.6 K/UL (ref 4–11)
WBC #/AREA URNS HPF: ABNORMAL /HPF (ref 0–5)

## 2023-08-22 PROCEDURE — 74177 CT ABD & PELVIS W/CONTRAST: CPT

## 2023-08-22 PROCEDURE — 85025 COMPLETE CBC W/AUTO DIFF WBC: CPT

## 2023-08-22 PROCEDURE — 80048 BASIC METABOLIC PNL TOTAL CA: CPT

## 2023-08-22 PROCEDURE — 6360000004 HC RX CONTRAST MEDICATION: Performed by: EMERGENCY MEDICINE

## 2023-08-22 PROCEDURE — 99285 EMERGENCY DEPT VISIT HI MDM: CPT

## 2023-08-22 PROCEDURE — 81001 URINALYSIS AUTO W/SCOPE: CPT

## 2023-08-22 PROCEDURE — 83735 ASSAY OF MAGNESIUM: CPT

## 2023-08-22 RX ADMIN — IOPAMIDOL 75 ML: 755 INJECTION, SOLUTION INTRAVENOUS at 17:34

## 2023-08-22 ASSESSMENT — PAIN SCALES - GENERAL: PAINLEVEL_OUTOF10: 9

## 2023-08-22 ASSESSMENT — PAIN - FUNCTIONAL ASSESSMENT: PAIN_FUNCTIONAL_ASSESSMENT: 0-10

## 2023-08-22 NOTE — ED PROVIDER NOTES
Recently diagnosed with prostatitis on 8/13/2023 with a urinalysis concern for possible infection though cultures at that time were negative. Patient does have to straight cath himself daily for some mild urinary retention. Urinalysis here appears similar to prior. Has been on Cipro without symptomatic change. Has no tenderness of the prostate whatsoever. Given his age and comorbidities some imaging was performed which showed some mild bilateral hydro. This certainly could potentially be playing into the patient's symptoms as well. Regardless, no overt obstruction. Making urine readily. Do not feel new antibiotics indicated. Do feel follow-up with urology is warranted and the patient's urologist has made note that he is happy to follow him up as an outpatient. Do feel that he could be discharged kameron    Medications and Route:   Medications   iopamidol (ISOVUE-370) 76 % injection 75 mL (75 mLs IntraVENous Given 8/22/23 7141)       History From: Patient         Chronic Conditions: Noted in HPI    CONSULTS: (Who and What was discussed)  None            IBarbra M.D., am the primary clinician of record. MDM      CONSULTS     None    Critical Care:   None    REASSESSMENT          PROCEDURE     Unless otherwise noted below, none     Procedures      FINAL IMPRESSION      1. Pelvic pain    2. Hydronephrosis, unspecified hydronephrosis type            DISPOSITION/PLAN   DISPOSITION Decision To Discharge 08/22/2023 07:04:05 PM        PATIENT REFERRED TO:  Brandy Aguirre MD  [address] [facility]. [address] [place] [address]    Schedule an appointment as soon as possible for a visit   Call for follow up with your Urologist      DISCHARGE MEDICATIONS:  Discharge Medication List as of 8/22/2023  7:26 PM        Controlled Substances Monitoring:     No flowsheet data found.     (Please note that portions of this note were completed with a voice recognition program.  Efforts were made to edit the

## 2023-08-22 NOTE — DISCHARGE INSTRUCTIONS
Please call for follow up with your Urologist regarding your pelvic pain and the distention in your Ureters found on today's CT Scan.

## 2024-05-07 ENCOUNTER — APPOINTMENT (OUTPATIENT)
Dept: GENERAL RADIOLOGY | Age: 79
End: 2024-05-07
Payer: MEDICARE

## 2024-05-07 ENCOUNTER — HOSPITAL ENCOUNTER (EMERGENCY)
Age: 79
Discharge: HOME | End: 2024-05-08
Attending: EMERGENCY MEDICINE
Payer: MEDICARE

## 2024-05-07 DIAGNOSIS — S46.911A STRAIN OF RIGHT SHOULDER, INITIAL ENCOUNTER: Primary | ICD-10-CM

## 2024-05-07 DIAGNOSIS — I10 ESSENTIAL HYPERTENSION: ICD-10-CM

## 2024-05-07 PROCEDURE — 73030 X-RAY EXAM OF SHOULDER: CPT

## 2024-05-07 PROCEDURE — 99283 EMERGENCY DEPT VISIT LOW MDM: CPT

## 2024-05-07 PROCEDURE — 6370000000 HC RX 637 (ALT 250 FOR IP): Performed by: EMERGENCY MEDICINE

## 2024-05-07 RX ORDER — HYDROCODONE BITARTRATE AND ACETAMINOPHEN 5; 325 MG/1; MG/1
1 TABLET ORAL ONCE
Status: COMPLETED | OUTPATIENT
Start: 2024-05-07 | End: 2024-05-07

## 2024-05-07 RX ADMIN — HYDROCODONE BITARTRATE AND ACETAMINOPHEN 1 TABLET: 5; 325 TABLET ORAL at 23:37

## 2024-05-07 ASSESSMENT — LIFESTYLE VARIABLES
HOW MANY STANDARD DRINKS CONTAINING ALCOHOL DO YOU HAVE ON A TYPICAL DAY: PATIENT DOES NOT DRINK
HOW OFTEN DO YOU HAVE A DRINK CONTAINING ALCOHOL: NEVER

## 2024-05-07 ASSESSMENT — PAIN - FUNCTIONAL ASSESSMENT: PAIN_FUNCTIONAL_ASSESSMENT: 0-10

## 2024-05-07 ASSESSMENT — PAIN DESCRIPTION - LOCATION: LOCATION: SHOULDER

## 2024-05-07 ASSESSMENT — PAIN SCALES - GENERAL
PAINLEVEL_OUTOF10: 10
PAINLEVEL_OUTOF10: 7

## 2024-05-07 ASSESSMENT — PAIN DESCRIPTION - ORIENTATION: ORIENTATION: RIGHT

## 2024-05-08 ENCOUNTER — TELEPHONE (OUTPATIENT)
Dept: ORTHOPEDIC SURGERY | Age: 79
End: 2024-05-08

## 2024-05-08 VITALS
HEIGHT: 69 IN | HEART RATE: 70 BPM | TEMPERATURE: 97.6 F | SYSTOLIC BLOOD PRESSURE: 140 MMHG | DIASTOLIC BLOOD PRESSURE: 82 MMHG | OXYGEN SATURATION: 99 % | WEIGHT: 174.7 LBS | RESPIRATION RATE: 16 BRPM | BODY MASS INDEX: 25.87 KG/M2

## 2024-05-08 RX ORDER — HYDROCODONE BITARTRATE AND ACETAMINOPHEN 5; 325 MG/1; MG/1
1 TABLET ORAL EVERY 6 HOURS PRN
Qty: 15 TABLET | Refills: 0 | Status: SHIPPED | OUTPATIENT
Start: 2024-05-08 | End: 2024-05-12

## 2024-05-08 NOTE — TELEPHONE ENCOUNTER
Unable to leave message regarding ED referral. Upon return call please schedule with shoulder provider.
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
## 2024-05-09 ENCOUNTER — TELEPHONE (OUTPATIENT)
Dept: ORTHOPEDIC SURGERY | Age: 79
End: 2024-05-09

## 2024-05-09 NOTE — TELEPHONE ENCOUNTER
Unable to leave message regarding ED referral for an appointment. Upon return call please schedule with shoulder provider.

## 2024-05-21 ENCOUNTER — OFFICE VISIT (OUTPATIENT)
Dept: ORTHOPEDIC SURGERY | Age: 79
End: 2024-05-21
Payer: MEDICARE

## 2024-05-21 DIAGNOSIS — M25.511 RIGHT SHOULDER PAIN, UNSPECIFIED CHRONICITY: Primary | ICD-10-CM

## 2024-05-21 DIAGNOSIS — S46.012A TRAUMATIC TEAR OF LEFT ROTATOR CUFF, UNSPECIFIED TEAR EXTENT, INITIAL ENCOUNTER: ICD-10-CM

## 2024-05-21 PROCEDURE — G8419 CALC BMI OUT NRM PARAM NOF/U: HCPCS | Performed by: PHYSICIAN ASSISTANT

## 2024-05-21 PROCEDURE — 1036F TOBACCO NON-USER: CPT | Performed by: PHYSICIAN ASSISTANT

## 2024-05-21 PROCEDURE — 99203 OFFICE O/P NEW LOW 30 MIN: CPT | Performed by: PHYSICIAN ASSISTANT

## 2024-05-21 PROCEDURE — 1123F ACP DISCUSS/DSCN MKR DOCD: CPT | Performed by: PHYSICIAN ASSISTANT

## 2024-05-21 PROCEDURE — G8428 CUR MEDS NOT DOCUMENT: HCPCS | Performed by: PHYSICIAN ASSISTANT

## 2024-05-21 NOTE — PROGRESS NOTES
Type:   Eval and Treat     Referral Reason:   Specialty Services Required     Requested Specialty:   Physical Therapist     Number of Visits Requested:   1         Assessment and Plan  Eagle was seen today for shoulder pain.    Diagnoses and all orders for this visit:    Right shoulder pain, unspecified chronicity  -     XR SHOULDER RIGHT (MIN 2 VIEWS); Future    Traumatic tear of left rotator cuff, unspecified tear extent, initial encounter  -     [facility] Physical [facility]        Patient likely has an acute on chronic large rotator cuff tear.  He has an acute bicep tendon tear which is leading to his significant ecchymosis in his right upper extremity along with Eliquis.  I have also recommended physical therapy for rotator cuff and deltoid strengthening.  I did give him a prescription but I am doubtful that he we will go.  I have offered him a cortisone injection but he would like to wait at this time.  I am happy to see him back at any time if he wants further help    I discussed with Eagle Forrest that his history, symptoms, signs, and imaging are most consistent with rotator cuff full thickness tears.    We reviewed the natural history of these conditions and treatment options ranging from conservative measures (rest, icing, activity modification, physical therapy, pain meds, cortisone injection) to surgical options.     In terms of treatment, I recommended continuing with rest, icing, avoidance of painful activities, NSAIDs or pain meds as tolerated, and physical therapy.     If these are not effective, cortisone injection can be considered.  We discussed surgical options as well, should conservative measures fail.     Electronically signed by Duong Buck PA-C on 5/21/2024 at 2:01 PM  This dictation was generated by voice recognition computer software.  Although all attempts are made to edit the dictation for accuracy, there may be errors in the transcription that are not

## (undated) DEVICE — CONMED SCOPE SAVER BITE BLOCK, 20X27 MM: Brand: SCOPE SAVER

## (undated) DEVICE — ENDO CARRY-ON PROCEDURE KIT INCLUDES SUCTION TUBING, LUBRICANT, GAUZE, BIOHAZARD STICKER, TRANSPORT PAD AND INTERCEPT BEDSIDE KIT.: Brand: ENDO CARRY-ON PROCEDURE KIT

## (undated) DEVICE — ESOPHAGEAL/PYLORIC/COLONIC/BILIARY WIREGUIDED BALLOON DILATATION CATHETER: Brand: CRE™ PRO

## (undated) DEVICE — ELECTRODE ECG MONITR FOAM TEAR DROP ADLT RED